# Patient Record
Sex: FEMALE | Race: WHITE | Employment: FULL TIME | URBAN - METROPOLITAN AREA
[De-identification: names, ages, dates, MRNs, and addresses within clinical notes are randomized per-mention and may not be internally consistent; named-entity substitution may affect disease eponyms.]

---

## 2017-03-24 PROBLEM — M54.9 BACK PAIN: Status: ACTIVE | Noted: 2017-03-24

## 2017-03-24 PROBLEM — M47.27 LUMBOSACRAL SPONDYLOSIS WITH RADICULOPATHY: Status: ACTIVE | Noted: 2017-03-24

## 2017-05-09 PROCEDURE — 86160 COMPLEMENT ANTIGEN: CPT | Performed by: INTERNAL MEDICINE

## 2017-05-09 PROCEDURE — 86038 ANTINUCLEAR ANTIBODIES: CPT | Performed by: INTERNAL MEDICINE

## 2017-05-09 PROCEDURE — 86800 THYROGLOBULIN ANTIBODY: CPT | Performed by: INTERNAL MEDICINE

## 2017-05-09 PROCEDURE — 84432 ASSAY OF THYROGLOBULIN: CPT | Performed by: INTERNAL MEDICINE

## 2017-05-09 PROCEDURE — 81003 URINALYSIS AUTO W/O SCOPE: CPT | Performed by: INTERNAL MEDICINE

## 2017-07-29 PROCEDURE — 84432 ASSAY OF THYROGLOBULIN: CPT | Performed by: INTERNAL MEDICINE

## 2017-07-29 PROCEDURE — 86800 THYROGLOBULIN ANTIBODY: CPT | Performed by: INTERNAL MEDICINE

## 2017-09-01 ENCOUNTER — HOSPITAL ENCOUNTER (OUTPATIENT)
Age: 52
Discharge: HOME | End: 2017-09-01
Attending: FAMILY MEDICINE
Payer: COMMERCIAL

## 2017-09-01 VITALS
OXYGEN SATURATION: 97 % | HEIGHT: 63 IN | SYSTOLIC BLOOD PRESSURE: 128 MMHG | WEIGHT: 156 LBS | TEMPERATURE: 99 F | HEART RATE: 72 BPM | DIASTOLIC BLOOD PRESSURE: 72 MMHG | BODY MASS INDEX: 27.64 KG/M2 | RESPIRATION RATE: 18 BRPM

## 2017-09-01 DIAGNOSIS — J20.9 BRONCHITIS WITH ASTHMA, SUBACUTE: ICD-10-CM

## 2017-09-01 DIAGNOSIS — J30.1 CHRONIC SEASONAL ALLERGIC RHINITIS DUE TO POLLEN: Primary | ICD-10-CM

## 2017-09-01 DIAGNOSIS — J45.909 BRONCHITIS WITH ASTHMA, SUBACUTE: ICD-10-CM

## 2017-09-01 PROCEDURE — 99203 OFFICE O/P NEW LOW 30 MIN: CPT

## 2017-09-01 PROCEDURE — 99213 OFFICE O/P EST LOW 20 MIN: CPT

## 2017-09-01 RX ORDER — PREDNISONE 20 MG/1
60 TABLET ORAL DAILY
Qty: 15 TABLET | Refills: 0 | Status: SHIPPED | OUTPATIENT
Start: 2017-09-01 | End: 2017-09-06

## 2017-09-01 RX ORDER — ALBUTEROL SULFATE 90 UG/1
2 AEROSOL, METERED RESPIRATORY (INHALATION) EVERY 6 HOURS
Qty: 1 INHALER | Refills: 5 | Status: SHIPPED | OUTPATIENT
Start: 2017-09-01 | End: 2019-07-13

## 2017-09-01 RX ORDER — AZITHROMYCIN 250 MG/1
TABLET, FILM COATED ORAL
Qty: 1 PACKAGE | Refills: 0 | Status: SHIPPED | OUTPATIENT
Start: 2017-09-01 | End: 2017-09-12 | Stop reason: ALTCHOICE

## 2017-09-01 NOTE — ED PROVIDER NOTES
Patient Seen in: [facility]    History   Patient presents with:  Cough/URI    Stated Complaint: cough x1 day    HPI    Patient past with a history of seasonal allergic rhinitis and asthma with 1 week of nonproductive cough, Tab,  500 mg once followed by 250 mg daily x 4 days   predniSONE 20 MG Oral Tab,  Take 3 tablets (60 mg total) by mouth daily.    Albuterol Sulfate HFA (PROAIR HFA) 108 (90 Base) MCG/ACT Inhalation Aero Soln,  Inhale 2 puffs into the lungs every 6 (six) chuck • Genito-Urinary Disorder Sister      hysterectomy, fibroid   • Musculo-skelatal Disorder Sister      spinal surgery   • Arthritis Daughter      rheumatoid arthritis   • Asthma Daughter      PCOS   • Asthma Daughter    • Allergies Daughter    • Glaucoma worse.        Disposition and Plan     Clinical Impression:  Chronic seasonal allergic rhinitis due to pollen  (primary encounter diagnosis)  Bronchitis with asthma, subacute    Disposition:  Discharge    Follow-up:  Leandra Beltran MD  [address] [place] [address]

## 2017-09-01 NOTE — ED INITIAL ASSESSMENT (HPI)
Pt c/o coughing and sinus congestion. Pt has environmental allergies. Pt has a hx of asthma and states that every allergy season she gets very sick very quickly. Pt is trying to get her cough under control before it \"gets bad\".

## 2017-09-02 NOTE — ED NOTES
Spoke with patient after receiving a [facility] fax with question. Pt. Asked if she can take Zyrtec with her steroids. Dr. Boris Reyes reports that she can take the medications together. Pt. Reports she is feeling better today.   No further questions or concern

## 2017-09-11 ENCOUNTER — HOSPITAL ENCOUNTER (OUTPATIENT)
Dept: PHYSICAL THERAPY | Facility: HOSPITAL | Age: 52
Setting detail: THERAPIES SERIES
End: 2017-09-11
Attending: PHYSICAL MEDICINE & REHABILITATION
Payer: COMMERCIAL

## 2017-09-13 ENCOUNTER — APPOINTMENT (OUTPATIENT)
Dept: PHYSICAL THERAPY | Facility: HOSPITAL | Age: 52
End: 2017-09-13
Payer: COMMERCIAL

## 2017-09-18 ENCOUNTER — HOSPITAL ENCOUNTER (OUTPATIENT)
Dept: PHYSICAL THERAPY | Facility: HOSPITAL | Age: 52
Setting detail: THERAPIES SERIES
Discharge: HOME | End: 2017-09-18
Attending: PHYSICAL MEDICINE & REHABILITATION
Payer: COMMERCIAL

## 2017-09-18 DIAGNOSIS — M79.10 MYALGIA: ICD-10-CM

## 2017-09-18 PROCEDURE — 97110 THERAPEUTIC EXERCISES: CPT

## 2017-09-18 PROCEDURE — 97161 PT EVAL LOW COMPLEX 20 MIN: CPT

## 2017-09-20 ENCOUNTER — APPOINTMENT (OUTPATIENT)
Dept: PHYSICAL THERAPY | Facility: HOSPITAL | Age: 52
End: 2017-09-20
Payer: COMMERCIAL

## 2017-09-27 ENCOUNTER — APPOINTMENT (OUTPATIENT)
Dept: PHYSICAL THERAPY | Facility: HOSPITAL | Age: 52
End: 2017-09-27
Payer: COMMERCIAL

## 2017-10-02 ENCOUNTER — HOSPITAL ENCOUNTER (OUTPATIENT)
Dept: PHYSICAL THERAPY | Facility: HOSPITAL | Age: 52
Setting detail: THERAPIES SERIES
Discharge: HOME | End: 2017-10-02
Attending: PHYSICAL MEDICINE & REHABILITATION
Payer: COMMERCIAL

## 2017-10-02 PROCEDURE — 97140 MANUAL THERAPY 1/> REGIONS: CPT

## 2017-10-02 PROCEDURE — 97110 THERAPEUTIC EXERCISES: CPT

## 2017-10-04 NOTE — PROGRESS NOTES
Dx: Myalgia; back pain        Authorized # of Visits:  18 vists         Next MD visit: none scheduled  Fall Risk: standard         Precautions: n/a             Subjective: Pt. Arrived 15 minutes late. Wearing CAM boot on R foot.  States she threw out her ba region. 7. Patient to be independent with HEP, joint protection principles, improved posture and body mechanics. Plan: Assess response to last session. Add seated on SB next session. Date: 10/4/2017  Tx#: 2/18 Date: Tx#: 3/ Date:    Tx#: 4/ Da

## 2017-10-16 ENCOUNTER — HOSPITAL ENCOUNTER (OUTPATIENT)
Dept: PHYSICAL THERAPY | Facility: HOSPITAL | Age: 52
Setting detail: THERAPIES SERIES
Discharge: HOME | End: 2017-10-16
Attending: PHYSICAL MEDICINE & REHABILITATION
Payer: COMMERCIAL

## 2017-10-16 PROCEDURE — 97110 THERAPEUTIC EXERCISES: CPT

## 2017-10-16 PROCEDURE — 97140 MANUAL THERAPY 1/> REGIONS: CPT

## 2017-10-16 NOTE — PROGRESS NOTES
6jDx: Myalgia; back pain        Authorized # of Visits:  18 vists         Next MD visit: none scheduled  Fall Risk: standard         Precautions: n/a             Subjective: Pt. Arrived 15 minutes late. No longer wearing CAM boot.  States she got an injecti feet x 2 laps        Transfer training Airex  *step ups x 10  *fwd lunge x 10  *lat lunge x 10  *squats x 10        DKTC x 10 Seated on SB  *pelvic circles cw/ccw x 10  *march x 10  *knee ext x 10  *opp arm/leg x 10          SKTC x 10 Mass flex with SB  *a

## 2017-10-23 ENCOUNTER — HOSPITAL ENCOUNTER (OUTPATIENT)
Dept: PHYSICAL THERAPY | Facility: HOSPITAL | Age: 52
Setting detail: THERAPIES SERIES
Discharge: HOME | End: 2017-10-23
Attending: PHYSICAL MEDICINE & REHABILITATION
Payer: COMMERCIAL

## 2017-10-23 PROCEDURE — 97140 MANUAL THERAPY 1/> REGIONS: CPT

## 2017-10-23 PROCEDURE — 97110 THERAPEUTIC EXERCISES: CPT

## 2017-10-23 NOTE — PROGRESS NOTES
6jDx: Myalgia; back pain        Authorized # of Visits:  18 vists         Next MD visit: none scheduled  Fall Risk: standard         Precautions: n/a             Subjective: Pt. Arrived 15 minutes late. Pt. States currently no pain.  Has had a good day toda Mass flex with SB  *all four squeeze x 10  *diagonal squeeze x 10 Seated on SB  *pelvic circles cw/ccw x 10  *march x 10  *knee ext x 10  *opp arm/leg x 10       LTR x 10 DKTC with SB x 10    LTR with SB x 10 Mass flex with SB with alt arm lift x 10  *with

## 2017-10-30 ENCOUNTER — HOSPITAL ENCOUNTER (OUTPATIENT)
Dept: PHYSICAL THERAPY | Facility: HOSPITAL | Age: 52
Setting detail: THERAPIES SERIES
Discharge: HOME | End: 2017-10-30
Attending: PHYSICAL MEDICINE & REHABILITATION
Payer: COMMERCIAL

## 2017-10-30 PROCEDURE — 97140 MANUAL THERAPY 1/> REGIONS: CPT

## 2017-10-30 PROCEDURE — 97110 THERAPEUTIC EXERCISES: CPT

## 2017-11-02 NOTE — PROGRESS NOTES
Dx: Myalgia; back pain        Authorized # of Visits:  18 vists         Next MD visit: none scheduled  Fall Risk: standard         Precautions: n/a             Subjective: Pt. Presents emotional with tears in eyes, states they announced changes at her work stretch on 8 inch step x 10 Quad stretch on 8 inch step x 10      DKTC x 10 Seated on SB  *pelvic circles cw/ccw x 10  *march x 10  *knee ext x 10  *opp arm/leg x 10   Bosu ball  *step ups x 10  *fwd lunge x 10  *lat lunge x 10  *squats x 10 Bosu ball  *st

## 2017-11-06 ENCOUNTER — HOSPITAL ENCOUNTER (OUTPATIENT)
Dept: PHYSICAL THERAPY | Facility: HOSPITAL | Age: 52
Setting detail: THERAPIES SERIES
End: 2017-11-06
Attending: PHYSICAL MEDICINE & REHABILITATION
Payer: COMMERCIAL

## 2017-11-13 ENCOUNTER — APPOINTMENT (OUTPATIENT)
Dept: PHYSICAL THERAPY | Facility: HOSPITAL | Age: 52
End: 2017-11-13
Attending: PHYSICAL MEDICINE & REHABILITATION
Payer: COMMERCIAL

## 2017-11-14 ENCOUNTER — HOSPITAL ENCOUNTER (OUTPATIENT)
Dept: PHYSICAL THERAPY | Facility: HOSPITAL | Age: 52
Setting detail: THERAPIES SERIES
Discharge: HOME | End: 2017-11-14
Attending: PHYSICAL MEDICINE & REHABILITATION
Payer: COMMERCIAL

## 2017-11-14 PROCEDURE — 97140 MANUAL THERAPY 1/> REGIONS: CPT

## 2017-11-14 PROCEDURE — 97110 THERAPEUTIC EXERCISES: CPT

## 2017-11-15 NOTE — PROGRESS NOTES
Dx: Myalgia; back pain        Authorized # of Visits:  18 vists         Next MD visit: none scheduled  Fall Risk: standard         Precautions: n/a             Subjective: Patient reports having R sided low back pain that wrapped around to front of hip aft 10  *fwd lunge x 10  *lat lunge x 10  *squats x 10 Quad stretch on 8 inch step x 10 Quad stretch on 8 inch step x 10 Quad stretch on 8 inch step x 10     DKTC x 10 Seated on SB  *pelvic circles cw/ccw x 10  *march x 10  *knee ext x 10  *opp arm/leg x 10 lumbar PAs and unilat x 10  L sidelying rotationals mobs Gr I and II Gentle GR I and II lumbar PAs and unilat x 10  L sidelying rotationals mobs Gr I and II Gentle GR I and II lumbar PAs and unilat x 10  L sidelying rotationals mobs Gr I and II Gentle GR I

## 2017-11-20 PROCEDURE — 86800 THYROGLOBULIN ANTIBODY: CPT | Performed by: INTERNAL MEDICINE

## 2017-11-20 PROCEDURE — 84432 ASSAY OF THYROGLOBULIN: CPT | Performed by: INTERNAL MEDICINE

## 2017-12-11 ENCOUNTER — HOSPITAL ENCOUNTER (OUTPATIENT)
Dept: PHYSICAL THERAPY | Facility: HOSPITAL | Age: 52
Setting detail: THERAPIES SERIES
Discharge: HOME | End: 2017-12-11
Attending: PHYSICAL MEDICINE & REHABILITATION
Payer: COMMERCIAL

## 2017-12-11 PROCEDURE — 97110 THERAPEUTIC EXERCISES: CPT

## 2017-12-11 PROCEDURE — 97140 MANUAL THERAPY 1/> REGIONS: CPT

## 2017-12-26 NOTE — PROGRESS NOTES
Dx: Myalgia; back pain        Authorized # of Visits:  18 vists         Next MD visit: none scheduled  Fall Risk: standard         Precautions: n/a             Subjective: Patient reports having R sided low back pain that wrapped around to front of hip aft 10  *squats x 10 Quad stretch on 8 inch step x 10 Quad stretch on 8 inch step x 10 Quad stretch on 8 inch step x 10 Quad stretch on 8 inch step x 10    DKTC x 10 Seated on SB  *pelvic circles cw/ccw x 10  *march x 10  *knee ext x 10  *opp arm/leg x 10   [place] Bridge with SB and alt leg lift x 10    Bridging x 10 Bridge with SB x 10 HS stretch with flossing HS stretch with flossing HS stretch with flossing HS stretch with flossing    PPT c march x 10         STM lumbar paraspinals, QL STM lumbar paraspinals, QL

## 2017-12-27 ENCOUNTER — HOSPITAL ENCOUNTER (OUTPATIENT)
Dept: PHYSICAL THERAPY | Facility: HOSPITAL | Age: 52
Setting detail: THERAPIES SERIES
Discharge: HOME | End: 2017-12-27
Attending: PHYSICAL MEDICINE & REHABILITATION
Payer: COMMERCIAL

## 2017-12-27 PROCEDURE — 97110 THERAPEUTIC EXERCISES: CPT

## 2017-12-27 PROCEDURE — 97140 MANUAL THERAPY 1/> REGIONS: CPT

## 2017-12-28 NOTE — PROGRESS NOTES
Dear Dr. Jocelyne Singh MD,    This letter is to inform you of Anaid Fuentes in Physical Therapy at [facility].     DX: Myalgia; back pain      TREATMENT #   8    Of    8   DATE OF SE region. MET    7. Patient to be independent with HEP, joint protection principles, improved posture and body mechanics. MET    RECOMMENDATIONS AND PLAN OF TREATMENT  All goals met. Discharge patient. Patient to continue independently with current HEP.     R

## 2018-01-29 PROBLEM — J06.9 VIRAL URI: Status: ACTIVE | Noted: 2018-01-29

## 2018-05-14 PROCEDURE — 86800 THYROGLOBULIN ANTIBODY: CPT | Performed by: INTERNAL MEDICINE

## 2018-05-14 PROCEDURE — 36415 COLL VENOUS BLD VENIPUNCTURE: CPT | Performed by: INTERNAL MEDICINE

## 2018-05-14 PROCEDURE — 84432 ASSAY OF THYROGLOBULIN: CPT | Performed by: INTERNAL MEDICINE

## 2018-06-21 PROBLEM — M95.8 OSTEOCHONDRAL DEFECT OF TALUS: Status: ACTIVE | Noted: 2018-06-21

## 2018-07-18 PROCEDURE — 36415 COLL VENOUS BLD VENIPUNCTURE: CPT | Performed by: FAMILY MEDICINE

## 2018-07-18 PROCEDURE — 88175 CYTOPATH C/V AUTO FLUID REDO: CPT | Performed by: FAMILY MEDICINE

## 2018-07-18 PROCEDURE — 83001 ASSAY OF GONADOTROPIN (FSH): CPT | Performed by: FAMILY MEDICINE

## 2018-07-18 PROCEDURE — 87624 HPV HI-RISK TYP POOLED RSLT: CPT | Performed by: FAMILY MEDICINE

## 2018-07-24 PROBLEM — R00.2 PALPITATIONS: Status: ACTIVE | Noted: 2018-07-24

## 2018-12-27 PROCEDURE — 87086 URINE CULTURE/COLONY COUNT: CPT | Performed by: NURSE PRACTITIONER

## 2019-06-07 PROCEDURE — 86800 THYROGLOBULIN ANTIBODY: CPT | Performed by: INTERNAL MEDICINE

## 2019-06-07 PROCEDURE — 84432 ASSAY OF THYROGLOBULIN: CPT | Performed by: INTERNAL MEDICINE

## 2019-06-16 PROCEDURE — 87081 CULTURE SCREEN ONLY: CPT | Performed by: PHYSICIAN ASSISTANT

## 2019-09-03 PROBLEM — Z85.850 HISTORY OF THYROID CANCER: Status: ACTIVE | Noted: 2019-09-03

## 2019-09-03 PROBLEM — Z23 FLU VACCINE NEED: Status: ACTIVE | Noted: 2019-09-03

## 2020-03-01 ENCOUNTER — APPOINTMENT (OUTPATIENT)
Dept: CT IMAGING | Facility: HOSPITAL | Age: 55
End: 2020-03-01
Attending: EMERGENCY MEDICINE
Payer: COMMERCIAL

## 2020-03-01 ENCOUNTER — APPOINTMENT (OUTPATIENT)
Dept: MRI IMAGING | Facility: HOSPITAL | Age: 55
End: 2020-03-01
Attending: INTERNAL MEDICINE
Payer: COMMERCIAL

## 2020-03-01 ENCOUNTER — APPOINTMENT (OUTPATIENT)
Dept: GENERAL RADIOLOGY | Facility: HOSPITAL | Age: 55
End: 2020-03-01
Attending: EMERGENCY MEDICINE
Payer: COMMERCIAL

## 2020-03-01 ENCOUNTER — HOSPITAL ENCOUNTER (OUTPATIENT)
Facility: HOSPITAL | Age: 55
Setting detail: OBSERVATION
Discharge: HOME | End: 2020-03-02
Attending: EMERGENCY MEDICINE | Admitting: INTERNAL MEDICINE
Payer: COMMERCIAL

## 2020-03-01 DIAGNOSIS — R55 SYNCOPE, UNSPECIFIED SYNCOPE TYPE: Primary | ICD-10-CM

## 2020-03-01 PROBLEM — D72.829 LEUKOCYTOSIS: Status: ACTIVE | Noted: 2020-03-01

## 2020-03-01 LAB
ALBUMIN SERPL-MCNC: 3.6 G/DL (ref 3.4–5)
ALBUMIN/GLOB SERPL: 0.9 {RATIO} (ref 1–2)
ALP LIVER SERPL-CCNC: 85 U/L (ref 41–108)
ALT SERPL-CCNC: 42 U/L (ref 13–56)
ANION GAP SERPL CALC-SCNC: 6 MMOL/L (ref 0–18)
AST SERPL-CCNC: 44 U/L (ref 15–37)
ATRIAL RATE: 69 BPM
BASOPHILS # BLD AUTO: 0.02 X10(3) UL (ref 0–0.2)
BASOPHILS NFR BLD AUTO: 0.1 %
BILIRUB SERPL-MCNC: 0.5 MG/DL (ref 0.1–2)
BILIRUB UR QL STRIP.AUTO: NEGATIVE
BUN BLD-MCNC: 14 MG/DL (ref 7–18)
BUN/CREAT SERPL: 16.7 (ref 10–20)
CALCIUM BLD-MCNC: 8.5 MG/DL (ref 8.5–10.1)
CHLORIDE SERPL-SCNC: 110 MMOL/L (ref 98–112)
CLARITY UR REFRACT.AUTO: CLEAR
CO2 SERPL-SCNC: 25 MMOL/L (ref 21–32)
COLOR UR AUTO: YELLOW
CREAT BLD-MCNC: 0.84 MG/DL (ref 0.55–1.02)
D-DIMER: 0.88 UG/ML FEU (ref ?–0.54)
DEPRECATED RDW RBC AUTO: 41.9 FL (ref 35.1–46.3)
EOSINOPHIL # BLD AUTO: 0.02 X10(3) UL (ref 0–0.7)
EOSINOPHIL NFR BLD AUTO: 0.1 %
ERYTHROCYTE [DISTWIDTH] IN BLOOD BY AUTOMATED COUNT: 11.9 % (ref 11–15)
FLUAV + FLUBV RNA SPEC NAA+PROBE: NEGATIVE
GLOBULIN PLAS-MCNC: 3.8 G/DL (ref 2.8–4.4)
GLUCOSE BLD-MCNC: 98 MG/DL (ref 70–99)
GLUCOSE UR STRIP.AUTO-MCNC: NEGATIVE MG/DL
HCT VFR BLD AUTO: 42.6 % (ref 35–48)
HGB BLD-MCNC: 14.1 G/DL (ref 12–16)
IMM GRANULOCYTES # BLD AUTO: 0.06 X10(3) UL (ref 0–1)
IMM GRANULOCYTES NFR BLD: 0.4 %
KETONES UR STRIP.AUTO-MCNC: NEGATIVE MG/DL
LYMPHOCYTES # BLD AUTO: 0.36 X10(3) UL (ref 1–4)
LYMPHOCYTES NFR BLD AUTO: 2.5 %
M PROTEIN MFR SERPL ELPH: 7.4 G/DL (ref 6.4–8.2)
MCH RBC QN AUTO: 31.3 PG (ref 26–34)
MCHC RBC AUTO-ENTMCNC: 33.1 G/DL (ref 31–37)
MCV RBC AUTO: 94.7 FL (ref 80–100)
MONOCYTES # BLD AUTO: 0.44 X10(3) UL (ref 0.1–1)
MONOCYTES NFR BLD AUTO: 3 %
NEUTROPHILS # BLD AUTO: 13.55 X10 (3) UL (ref 1.5–7.7)
NEUTROPHILS # BLD AUTO: 13.55 X10(3) UL (ref 1.5–7.7)
NEUTROPHILS NFR BLD AUTO: 93.9 %
NITRITE UR QL STRIP.AUTO: NEGATIVE
OSMOLALITY SERPL CALC.SUM OF ELEC: 292 MOSM/KG (ref 275–295)
P AXIS: 50 DEGREES
P-R INTERVAL: 210 MS
PH UR STRIP.AUTO: 6 [PH] (ref 4.5–8)
PLATELET # BLD AUTO: 263 10(3)UL (ref 150–450)
POTASSIUM SERPL-SCNC: 4.2 MMOL/L (ref 3.5–5.1)
PROT UR STRIP.AUTO-MCNC: NEGATIVE MG/DL
Q-T INTERVAL: 414 MS
QRS DURATION: 80 MS
QTC CALCULATION (BEZET): 443 MS
R AXIS: 35 DEGREES
RBC # BLD AUTO: 4.5 X10(6)UL (ref 3.8–5.3)
RBC UR QL AUTO: NEGATIVE
SODIUM SERPL-SCNC: 141 MMOL/L (ref 136–145)
SP GR UR STRIP.AUTO: 1.03 (ref 1–1.03)
T AXIS: 54 DEGREES
T3FREE SERPL-MCNC: 2.43 PG/ML (ref 2.4–4.2)
T4 FREE SERPL-MCNC: 1.1 NG/DL (ref 0.8–1.7)
TROPONIN I SERPL-MCNC: <0.045 NG/ML (ref ?–0.04)
TSI SER-ACNC: 0.18 MIU/ML (ref 0.36–3.74)
UROBILINOGEN UR STRIP.AUTO-MCNC: <2 MG/DL
VENTRICULAR RATE: 69 BPM
WBC # BLD AUTO: 14.5 X10(3) UL (ref 4–11)

## 2020-03-01 PROCEDURE — 85379 FIBRIN DEGRADATION QUANT: CPT | Performed by: EMERGENCY MEDICINE

## 2020-03-01 PROCEDURE — 80053 COMPREHEN METABOLIC PANEL: CPT | Performed by: EMERGENCY MEDICINE

## 2020-03-01 PROCEDURE — 99285 EMERGENCY DEPT VISIT HI MDM: CPT

## 2020-03-01 PROCEDURE — A9575 INJ GADOTERATE MEGLUMI 0.1ML: HCPCS | Performed by: INTERNAL MEDICINE

## 2020-03-01 PROCEDURE — 84443 ASSAY THYROID STIM HORMONE: CPT | Performed by: INTERNAL MEDICINE

## 2020-03-01 PROCEDURE — 87086 URINE CULTURE/COLONY COUNT: CPT | Performed by: INTERNAL MEDICINE

## 2020-03-01 PROCEDURE — 87502 INFLUENZA DNA AMP PROBE: CPT | Performed by: EMERGENCY MEDICINE

## 2020-03-01 PROCEDURE — 71045 X-RAY EXAM CHEST 1 VIEW: CPT | Performed by: EMERGENCY MEDICINE

## 2020-03-01 PROCEDURE — 84481 FREE ASSAY (FT-3): CPT | Performed by: INTERNAL MEDICINE

## 2020-03-01 PROCEDURE — 85025 COMPLETE CBC W/AUTO DIFF WBC: CPT | Performed by: EMERGENCY MEDICINE

## 2020-03-01 PROCEDURE — 84484 ASSAY OF TROPONIN QUANT: CPT | Performed by: EMERGENCY MEDICINE

## 2020-03-01 PROCEDURE — 93005 ELECTROCARDIOGRAM TRACING: CPT

## 2020-03-01 PROCEDURE — 84439 ASSAY OF FREE THYROXINE: CPT | Performed by: INTERNAL MEDICINE

## 2020-03-01 PROCEDURE — 70553 MRI BRAIN STEM W/O & W/DYE: CPT | Performed by: INTERNAL MEDICINE

## 2020-03-01 PROCEDURE — 71275 CT ANGIOGRAPHY CHEST: CPT | Performed by: EMERGENCY MEDICINE

## 2020-03-01 PROCEDURE — 96360 HYDRATION IV INFUSION INIT: CPT

## 2020-03-01 PROCEDURE — 93010 ELECTROCARDIOGRAM REPORT: CPT

## 2020-03-01 PROCEDURE — 87999 UNLISTED MICROBIOLOGY PX: CPT

## 2020-03-01 PROCEDURE — 81001 URINALYSIS AUTO W/SCOPE: CPT | Performed by: INTERNAL MEDICINE

## 2020-03-01 PROCEDURE — 87798 DETECT AGENT NOS DNA AMP: CPT | Performed by: EMERGENCY MEDICINE

## 2020-03-01 RX ORDER — ASPIRIN 81 MG/1
324 TABLET, CHEWABLE ORAL ONCE
Status: COMPLETED | OUTPATIENT
Start: 2020-03-01 | End: 2020-03-01

## 2020-03-01 RX ORDER — AZELASTINE 1 MG/ML
2 SPRAY, METERED NASAL 2 TIMES DAILY
Status: DISCONTINUED | OUTPATIENT
Start: 2020-03-01 | End: 2020-03-02

## 2020-03-01 RX ORDER — ALBUTEROL SULFATE 2.5 MG/3ML
2.5 SOLUTION RESPIRATORY (INHALATION) EVERY 4 HOURS PRN
Status: DISCONTINUED | OUTPATIENT
Start: 2020-03-01 | End: 2020-03-02

## 2020-03-01 RX ORDER — MONTELUKAST SODIUM 10 MG/1
10 TABLET ORAL NIGHTLY
Status: DISCONTINUED | OUTPATIENT
Start: 2020-03-01 | End: 2020-03-02

## 2020-03-01 RX ORDER — IPRATROPIUM BROMIDE 42 UG/1
1 SPRAY, METERED NASAL 3 TIMES DAILY PRN
Status: DISCONTINUED | OUTPATIENT
Start: 2020-03-01 | End: 2020-03-02

## 2020-03-01 RX ORDER — ACETAMINOPHEN 325 MG/1
650 TABLET ORAL EVERY 6 HOURS PRN
Status: DISCONTINUED | OUTPATIENT
Start: 2020-03-01 | End: 2020-03-02

## 2020-03-01 RX ORDER — LEVOTHYROXINE SODIUM 112 UG/1
112 TABLET ORAL
Status: DISCONTINUED | OUTPATIENT
Start: 2020-03-02 | End: 2020-03-02

## 2020-03-01 RX ORDER — VERAPAMIL HYDROCHLORIDE 240 MG/1
240 TABLET, FILM COATED, EXTENDED RELEASE ORAL
Status: DISCONTINUED | OUTPATIENT
Start: 2020-03-01 | End: 2020-03-02

## 2020-03-01 RX ORDER — PANTOPRAZOLE SODIUM 20 MG/1
20 TABLET, DELAYED RELEASE ORAL DAILY
Status: DISCONTINUED | OUTPATIENT
Start: 2020-03-02 | End: 2020-03-02

## 2020-03-01 RX ORDER — METOCLOPRAMIDE HYDROCHLORIDE 5 MG/ML
10 INJECTION INTRAMUSCULAR; INTRAVENOUS EVERY 8 HOURS PRN
Status: DISCONTINUED | OUTPATIENT
Start: 2020-03-01 | End: 2020-03-02

## 2020-03-01 RX ORDER — PREGABALIN 50 MG/1
50 CAPSULE ORAL 2 TIMES DAILY
Status: DISCONTINUED | OUTPATIENT
Start: 2020-03-01 | End: 2020-03-02

## 2020-03-01 RX ORDER — ONDANSETRON 2 MG/ML
4 INJECTION INTRAMUSCULAR; INTRAVENOUS EVERY 6 HOURS PRN
Status: DISCONTINUED | OUTPATIENT
Start: 2020-03-01 | End: 2020-03-02

## 2020-03-01 RX ORDER — VALACYCLOVIR HYDROCHLORIDE 500 MG/1
500 TABLET, FILM COATED ORAL DAILY
Status: DISCONTINUED | OUTPATIENT
Start: 2020-03-02 | End: 2020-03-02

## 2020-03-01 RX ORDER — ECHINACEA PURPUREA EXTRACT 125 MG
1 TABLET ORAL 2 TIMES DAILY
Status: DISCONTINUED | OUTPATIENT
Start: 2020-03-01 | End: 2020-03-02

## 2020-03-01 RX ORDER — SODIUM CHLORIDE 9 MG/ML
INJECTION, SOLUTION INTRAVENOUS CONTINUOUS
Status: DISCONTINUED | OUTPATIENT
Start: 2020-03-01 | End: 2020-03-02

## 2020-03-01 RX ORDER — HEPARIN SODIUM 5000 [USP'U]/ML
5000 INJECTION, SOLUTION INTRAVENOUS; SUBCUTANEOUS EVERY 8 HOURS SCHEDULED
Status: CANCELLED | OUTPATIENT
Start: 2020-03-01

## 2020-03-01 NOTE — H&P
[facility] Hospitalist H&P       CC: Patient presents with:  Syncope       PCP: Didier Wilder MD    History of Present Illness:  Ms. Kecia Peña is a 48 yo female with PMH of asthma, thyroid cancer (2014 s/p thyroidectomy), GERD, ocular migraines who presented to t THYROIDECTOMY,MALIG,LTD NECK SURG     • UPPER GI ENDOSCOPY - REFERRAL  3/4/13 - [facility]    normal EGD.          ALL:    Dust Mites              HIVES, ITCHING  Mold                    Coughing  Ragweed                 Coughing     Home Medications:  No outpa kg)  01/20/20 : 165 lb (74.8 kg)  11/04/19 : 167 lb 9.6 oz (76 kg)      Wt Readings from Last 6 Encounters:  03/01/20 : 145 lb (65.8 kg)  01/20/20 : 165 lb (74.8 kg)  11/04/19 : 167 lb 9.6 oz (76 kg)  09/03/19 : 169 lb 6.4 oz (76.8 kg)  08/28/19 : 165 lb ( adenopathy or mass. MILI:  No mass or adenopathy. CARDIAC:  No enlargement, pericardial thickening, or significant calcification. PLEURA:  No mass or effusion. CHEST WALL:  No mass or axillary adenopathy.   LIMITED ABDOMEN:  There is a hyper enhancing le Thyroid Cancer s/p thyroidectomy in 2014  - continue home levothyroxine    # Asthma  - continue home meds    # Liver Lesion  - noted on CT -- will need outpatient f/u with PCP for MRI evaluation  - discussed with patient    # Elevated d-dimer  - CTA negati

## 2020-03-01 NOTE — ED PROVIDER NOTES
Patient Seen in: [facility] Emergency Department      History   Patient presents with:  Syncope    Stated Complaint: syncope x2     HPI    43-year-old with history of migraines, hypothyroidism presents for evaluation after syncopal event.   She woke reflux    • Glaucoma suspect    • Hashimoto's thyroiditis    • Latent tuberculosis 11/2016   • Menorrhagia    • Multinodular goiter (nontoxic)    • Ocular migraine               Past Surgical History:   Procedure Laterality Date   • BIOPSY OF [facility] Occasional cough is noted. Abdomen: Soft, nontender, nondistended. Back: No CVA tenderness. Extremities: No edema. No unilateral calf swelling or calf tenderness to palpation.   Skin: warm and dry, no diaphoresis    ED Course     Labs Reviewed   COMP M the hospital for further work-up. Admission disposition: 3/1/2020  2:16 PM         I spoke with Dr. Lyla Murphy from the hospitalist service.     At her request I also spoke with Dr. Liliana Wong from neurology    Results and plan of care discussed with the patien

## 2020-03-01 NOTE — ED INITIAL ASSESSMENT (HPI)
Pt presents to ed after having two syncope episodes, pt reports feeling nauseous and hot prior to event. Pt has no chest pain or SOB and states this has never happened before.

## 2020-03-02 ENCOUNTER — APPOINTMENT (OUTPATIENT)
Dept: CV DIAGNOSTICS | Facility: HOSPITAL | Age: 55
End: 2020-03-02
Attending: INTERNAL MEDICINE
Payer: COMMERCIAL

## 2020-03-02 VITALS
HEART RATE: 60 BPM | OXYGEN SATURATION: 97 % | BODY MASS INDEX: 29.64 KG/M2 | DIASTOLIC BLOOD PRESSURE: 68 MMHG | WEIGHT: 167.31 LBS | SYSTOLIC BLOOD PRESSURE: 117 MMHG | HEIGHT: 63 IN | TEMPERATURE: 100 F | RESPIRATION RATE: 16 BRPM

## 2020-03-02 LAB
ANION GAP SERPL CALC-SCNC: 4 MMOL/L (ref 0–18)
BASOPHILS # BLD AUTO: 0.01 X10(3) UL (ref 0–0.2)
BASOPHILS NFR BLD AUTO: 0.1 %
BUN BLD-MCNC: 11 MG/DL (ref 7–18)
BUN/CREAT SERPL: 16.9 (ref 10–20)
CALCIUM BLD-MCNC: 8.4 MG/DL (ref 8.5–10.1)
CHLORIDE SERPL-SCNC: 111 MMOL/L (ref 98–112)
CO2 SERPL-SCNC: 26 MMOL/L (ref 21–32)
CREAT BLD-MCNC: 0.65 MG/DL (ref 0.55–1.02)
DEPRECATED RDW RBC AUTO: 42.2 FL (ref 35.1–46.3)
EOSINOPHIL # BLD AUTO: 0.03 X10(3) UL (ref 0–0.7)
EOSINOPHIL NFR BLD AUTO: 0.4 %
ERYTHROCYTE [DISTWIDTH] IN BLOOD BY AUTOMATED COUNT: 12.1 % (ref 11–15)
GLUCOSE BLD-MCNC: 93 MG/DL (ref 70–99)
HCT VFR BLD AUTO: 38.7 % (ref 35–48)
HGB BLD-MCNC: 12.7 G/DL (ref 12–16)
IMM GRANULOCYTES # BLD AUTO: 0.02 X10(3) UL (ref 0–1)
IMM GRANULOCYTES NFR BLD: 0.3 %
LYMPHOCYTES # BLD AUTO: 0.88 X10(3) UL (ref 1–4)
LYMPHOCYTES NFR BLD AUTO: 12.7 %
MCH RBC QN AUTO: 31 PG (ref 26–34)
MCHC RBC AUTO-ENTMCNC: 32.8 G/DL (ref 31–37)
MCV RBC AUTO: 94.4 FL (ref 80–100)
MONOCYTES # BLD AUTO: 0.52 X10(3) UL (ref 0.1–1)
MONOCYTES NFR BLD AUTO: 7.5 %
NEUTROPHILS # BLD AUTO: 5.47 X10 (3) UL (ref 1.5–7.7)
NEUTROPHILS # BLD AUTO: 5.47 X10(3) UL (ref 1.5–7.7)
NEUTROPHILS NFR BLD AUTO: 79 %
OSMOLALITY SERPL CALC.SUM OF ELEC: 291 MOSM/KG (ref 275–295)
PLATELET # BLD AUTO: 235 10(3)UL (ref 150–450)
POTASSIUM SERPL-SCNC: 3.5 MMOL/L (ref 3.5–5.1)
POTASSIUM SERPL-SCNC: 3.6 MMOL/L (ref 3.5–5.1)
RBC # BLD AUTO: 4.1 X10(6)UL (ref 3.8–5.3)
SODIUM SERPL-SCNC: 141 MMOL/L (ref 136–145)
WBC # BLD AUTO: 6.9 X10(3) UL (ref 4–11)

## 2020-03-02 PROCEDURE — 84132 ASSAY OF SERUM POTASSIUM: CPT | Performed by: INTERNAL MEDICINE

## 2020-03-02 PROCEDURE — 93306 TTE W/DOPPLER COMPLETE: CPT | Performed by: INTERNAL MEDICINE

## 2020-03-02 PROCEDURE — 85025 COMPLETE CBC W/AUTO DIFF WBC: CPT | Performed by: INTERNAL MEDICINE

## 2020-03-02 PROCEDURE — 80048 BASIC METABOLIC PNL TOTAL CA: CPT | Performed by: INTERNAL MEDICINE

## 2020-03-02 RX ORDER — CEPHALEXIN 500 MG/1
500 CAPSULE ORAL 4 TIMES DAILY
Qty: 28 CAPSULE | Refills: 0 | Status: SHIPPED | OUTPATIENT
Start: 2020-03-02 | End: 2020-03-04 | Stop reason: ALTCHOICE

## 2020-03-02 RX ORDER — POTASSIUM CHLORIDE 20 MEQ/1
40 TABLET, EXTENDED RELEASE ORAL EVERY 4 HOURS
Status: DISCONTINUED | OUTPATIENT
Start: 2020-03-02 | End: 2020-03-02

## 2020-03-02 NOTE — PLAN OF CARE
Patient admitted with syncope ,denies any dizzy/chest pain,patient a&ox3 on room air ,ivf 0.9ns @ 100ml/hr infusing ,reported migraine headache and requested tylenol ,administered and effective,patient resting, v/s obtained ,echo and some labs in am,poc up Return mobility to safest level of function  Description  INTERVENTIONS:  - Assess patient stability and activity tolerance for standing, transferring and ambulating w/ or w/o assistive devices  - Assist with transfers and ambulation using safe patient cervantes

## 2020-03-02 NOTE — PLAN OF CARE
Patient is alert and oriented x4. NSR on tele. Oxygenation is 100% on RA. L sounds clear. VSS. Patient denies chest pain, shortness of breath, palpitations. Denies pain. Patient is resting comfortably in bed at this time.     POC: 0.9 at 100 ml/hr, ECHO, ne antiarrhythmic and heart rate control medications as ordered  - Initiate emergency measures for life threatening arrhythmias  - Monitor electrolytes and administer replacement therapy as ordered  Outcome: Progressing     Problem: MUSCULOSKELETAL - ADULT  G

## 2020-03-02 NOTE — CONSULTS
[facility]     Patient name:  Rosendo Ni    :    1965  MRN:   [id number]    Location:  [address]  Attending:  Yanick Baez MD    Date of Admission:   3/1/2020    HISTORY OF PRESENT ILLNESS   M Bilateral 1983    wisdom teeth x 3   • NM THYROID I-131 THERAPY FOR HYPERTHYROIDISM INT  (ZCA=77815)  2/5/2015    51 mc   • OSTEOCHONDRAL TALUS AUTOGRFT  11/30/2011    right ankle   • OTHER SURGICAL HISTORY Right     ankle surgery    • THYROIDECTOMY,MALIG, Nasal BID   • Levothyroxine Sodium  112 mcg Oral Before breakfast   • valACYclovir HCl  500 mg Oral Daily   • Verapamil HCl ER  240 mg Oral Daily     Continuous Infusions:  • sodium chloride 100 mL/hr at 03/02/20 0443     PRN Meds:  acetaminophen, ondanset 03/02/20  0528   WBC 14.5* 6.9   HGB 14.1 12.7   MCV 94.7 94.4   .0 235.0     Recent Labs   Lab 03/01/20  1129 03/02/20  0528    141   K 4.2 3.5    111   CO2 25.0 26.0   BUN 14 11   CREATSERUM 0.84 0.65   CA 8.5 8.4*   GLU 98 93     Rece

## 2020-03-03 ENCOUNTER — TELEPHONE (OUTPATIENT)
Dept: CARDIOLOGY UNIT | Facility: HOSPITAL | Age: 55
End: 2020-03-03

## 2020-03-03 NOTE — DISCHARGE SUMMARY
General Medicine Discharge Summary     Patient ID:  Blair Waldrop  47year old  8/17/1965    Admit date: 3/1/2020    Discharge date and time: 3/2/2020  4:45 PM     Attending Physician: Tina Gay MD    Primary Care Physician: Moni Ramírez MD 25 MG Oral Cap  Take 2 capsules (50 mg total) by mouth 2 (two) times daily. , Normal, Disp-120 capsule, R-2    Azelastine HCl 0.1 % Nasal Solution  2 sprays by Nasal route 2 (two) times daily. , Normal, Disp-1 Bottle, R-5    MONTELUKAST SODIUM 10 MG Oral Tab Magnesium (PRILOSEC OTC) 20 MG Oral Tab EC  Take 20 mg by mouth daily.   , Historical, Disp-14 tablet, R-3    Peak Flow Meter (POCKET PEAK FLOW METER) Does not apply Device  Use as needed, Normal, Disp-1 Device, R-0            Follow-up with   PCP in 1 week

## 2020-03-04 PROBLEM — R55 SYNCOPE: Status: RESOLVED | Noted: 2020-03-01 | Resolved: 2020-03-04

## 2020-07-16 ENCOUNTER — TELEPHONE (OUTPATIENT)
Dept: FAMILY MEDICINE CLINIC | Facility: CLINIC | Age: 55
End: 2020-07-16

## 2020-07-16 ENCOUNTER — TELEMEDICINE (OUTPATIENT)
Dept: TELEHEALTH | Age: 55
End: 2020-07-16

## 2020-07-16 ENCOUNTER — LAB ENCOUNTER (OUTPATIENT)
Dept: LAB | Facility: HOSPITAL | Age: 55
End: 2020-07-16
Attending: NURSE PRACTITIONER
Payer: COMMERCIAL

## 2020-07-16 DIAGNOSIS — J01.90 ACUTE SINUSITIS WITH SYMPTOMS > 10 DAYS: ICD-10-CM

## 2020-07-16 DIAGNOSIS — Z20.822 SUSPECTED COVID-19 VIRUS INFECTION: Primary | ICD-10-CM

## 2020-07-16 DIAGNOSIS — Z02.9 ENCOUNTERS FOR ADMINISTRATIVE PURPOSE: Primary | ICD-10-CM

## 2020-07-16 DIAGNOSIS — N76.0 ACUTE VAGINITIS: ICD-10-CM

## 2020-07-16 DIAGNOSIS — J45.41 MODERATE PERSISTENT ASTHMA WITH EXACERBATION: ICD-10-CM

## 2020-07-16 DIAGNOSIS — Z20.822 SUSPECTED COVID-19 VIRUS INFECTION: ICD-10-CM

## 2020-07-16 PROCEDURE — 99213 OFFICE O/P EST LOW 20 MIN: CPT | Performed by: NURSE PRACTITIONER

## 2020-07-16 RX ORDER — FLUCONAZOLE 150 MG/1
TABLET ORAL
Qty: 2 TABLET | Refills: 0 | Status: SHIPPED | OUTPATIENT
Start: 2020-07-16 | End: 2020-08-11

## 2020-07-16 RX ORDER — METHYLPREDNISOLONE 4 MG/1
TABLET ORAL
Qty: 21 TABLET | Refills: 0 | Status: SHIPPED | OUTPATIENT
Start: 2020-07-16 | End: 2020-08-11 | Stop reason: ALTCHOICE

## 2020-07-16 RX ORDER — AMOXICILLIN AND CLAVULANATE POTASSIUM 875; 125 MG/1; MG/1
1 TABLET, FILM COATED ORAL 2 TIMES DAILY
Qty: 20 TABLET | Refills: 0 | Status: SHIPPED | OUTPATIENT
Start: 2020-07-16 | End: 2020-07-26

## 2020-07-17 LAB — SARS-COV-2 RNA RESP QL NAA+PROBE: NOT DETECTED

## 2020-07-17 NOTE — TELEPHONE ENCOUNTER
Brisa Rivas is a 47year old female here today for a telemedicine/video visit. Virtual/Telephone Check-In    Brisa Rivas verbally consents to a Virtual/Telephone Check-In service on 07/16/20. Patient has been referred to the [facility] website at www. 875-125 MG Oral Tab Take 1 tablet by mouth 2 (two) times daily for 10 days.  20 tablet 0   • MONTELUKAST SODIUM 10 MG Oral Tab TAKE 1 TABLET BY MOUTH EVERY DAY EVERY NIGHT 90 tablet 0   • VERAPAMIL HCL  MG Oral Tab CR TAKE 1 TABLET (240 MG TOTAL) BY M tablet by mouth daily. • Omeprazole Magnesium (PRILOSEC OTC) 20 MG Oral Tab EC Take 20 mg by mouth daily.    14 tablet 3   • Peak Flow Meter (POCKET PEAK FLOW METER) Does not apply Device Use as needed 1 Device 0      Past Medical History:   Diagnosis D Grandfather    • Psychiatric Maternal Grandfather         suicide   • Genito-Urinary Disorder Sister         hysterectomy, fibroid   • Musculo-skelatal Disorder Sister         spinal surgery   • Arthritis Daughter         rheumatoid arthritis   • Asthma Da Dr. Santiago Coad if not improving with each day. Advised to go directly to the ED for any worsening of symptoms.     See Patient Instructions

## 2020-07-22 ENCOUNTER — TELEPHONE (OUTPATIENT)
Dept: FAMILY MEDICINE CLINIC | Facility: CLINIC | Age: 55
End: 2020-07-22

## 2020-09-23 ENCOUNTER — TELEMEDICINE (OUTPATIENT)
Dept: TELEHEALTH | Age: 55
End: 2020-09-23

## 2020-09-23 DIAGNOSIS — J02.9 PHARYNGITIS, UNSPECIFIED ETIOLOGY: Primary | ICD-10-CM

## 2020-09-23 PROCEDURE — 99213 OFFICE O/P EST LOW 20 MIN: CPT | Performed by: NURSE PRACTITIONER

## 2020-09-23 RX ORDER — AMOXICILLIN 500 MG/1
500 CAPSULE ORAL 2 TIMES DAILY
Qty: 20 CAPSULE | Refills: 0 | Status: SHIPPED | OUTPATIENT
Start: 2020-09-23 | End: 2020-10-03

## 2020-09-23 NOTE — PROGRESS NOTES
Virtual/Telephone Check-In    [address] [place] [address] Marely verbally consents to a Virtual/Telephone Check-In service on 09/23/20. Patient has been referred to the [facility] website at [email or web address] to review the yearly Consent to Treat document.   Patient unders • VALACYCLOVIR  MG Oral Tab TAKE 1 TABLET BY MOUTH TWICE A DAY (Patient taking differently: 500 mg daily.  Take BID x 7 days then take daily ) 60 tablet 5   • albuterol sulfate (2.5 MG/3ML) 0.083% Inhalation Nebu Soln Take 3 mL (2.5 mg total) by nebu EYES: Denies blurred vision or double vision  HENT: see hpi  CHEST: Denies chest pain, or palpitations  LUNGS: Denies shortness of breath, cough, or wheezing  GI: Denies abdominal pain, N/V/C/D.   MUSCULOSKELETAL: see hpi  LYMPH:  Denies lymphadenopathy  N Sore throats happen for many reasons, such as colds, allergies, cigarette smoke, air pollution, and infections caused by viruses or bacteria. In any case, your throat becomes red and sore.  Your goal for self-care is to reduce your discomfort while giving y · Limit contact with pets and with allergy-causing substances, such as pollen and mold. · Wash your hands often when you’re around someone with a sore throat or cold. This will keep viruses or bacteria from spreading.   · Limit outdoor time when air pollut Talk to your pediatrician regarding the use of this medicine in children. While this drug may be prescribed for selected conditions, precautions do apply. What side effects may I notice from receiving this medicine?   Side effects that you should report to What should I tell my health care provider before I take this medicine?   They need to know if you have any of these conditions:  · kidney disease  · an unusual or allergic reaction to amoxicillin, other penicillins, cephalosporin antibiotics, other medicin · Stay home. Call your healthcare provider and tell them you have symptoms of COVID-19. Do this before going to any hospital or clinic. Follow your provider's instructions. You may be advised to isolate yourself at home. This is called self-isolation.   · D · Stay home and start self-isolation. Don’t leave your home unless you need to get medical care. Don't go to work, school, or public areas. Don't use public transportation or taxis. · Follow all instructions from your healthcare provider.  Call your health · Getting rest. This helps your body fight the illness. · Staying hydrated. Drinking liquids is the best way to prevent dehydration. . Try to drink 6 to 8 glasses of liquids every day, or as advised by your provider.  Also check with your provider about whi · Don’t let anyone share household items with the sick person. This includes eating and drinking tools, towels, sheets, or blankets. · Clean fabrics and laundry thoroughly. · Keep other people and pets away from the sick person.     When you can stop self When to call your healthcare provider  Call your healthcare provider right away if a sick person has any of these:  · Trouble breathing  · Pain or pressure in chest  If a sick person has any of these, call 911:  · Trouble breathing that gets worse  · Pain Anyone who has been in close contact with someone who has COVID-19 should quarantine for at least 14 days from the time of exposure at home and follow the below recommendations. See recommendations below if COVID19 test is positive.     What counts as close 9. Avoid sharing personal items with other people in your household, like dishes, towels, and bedding   10. Clean all surfaces that are touched often, like counters, tabletops, and doorknobs.  Use household cleaning sprays or wipes according to the label in Please call your primary care provider within 2 days of your discharge to arrange for a telehealth follow-up.  CDC does not recommend repeat testing after a positive test.  Convalescent Plasma Donation Program  [address], in conjunction with [place] Centers for Disease Control & Prevention (CDC)  10 things you can do to manage your health at home, Tray.nl. pdf  PurchaseFilters.at Included in this visit, time may have been spent reviewing labs, medications, radiology tests and decision making. Appropriate medical decision-making and tests are ordered as detailed in the plan of care above.   Coding/billing information is submitted for

## 2020-09-23 NOTE — PATIENT INSTRUCTIONS
Self-Care for Sore Throats     Sore throats happen for many reasons, such as colds, allergies, cigarette smoke, air pollution, and infections caused by viruses or bacteria. In any case, your throat becomes red and sore.  Your goal for self-care is to redu · Limit contact with pets and with allergy-causing substances, such as pollen and mold. · Wash your hands often when you’re around someone with a sore throat or cold. This will keep viruses or bacteria from spreading.   · Limit outdoor time when air pollut Talk to your pediatrician regarding the use of this medicine in children. While this drug may be prescribed for selected conditions, precautions do apply. What side effects may I notice from receiving this medicine?   Side effects that you should report to What should I tell my health care provider before I take this medicine?   They need to know if you have any of these conditions:  · kidney disease  · an unusual or allergic reaction to amoxicillin, other penicillins, cephalosporin antibiotics, other medicin · Stay home. Call your healthcare provider and tell them you have symptoms of COVID-19. Do this before going to any hospital or clinic. Follow your provider's instructions. You may be advised to isolate yourself at home. This is called self-isolation.   · D · Stay home and start self-isolation. Don’t leave your home unless you need to get medical care. Don't go to work, school, or public areas. Don't use public transportation or taxis. · Follow all instructions from your healthcare provider.  Call your health · Getting rest. This helps your body fight the illness. · Staying hydrated. Drinking liquids is the best way to prevent dehydration. . Try to drink 6 to 8 glasses of liquids every day, or as advised by your provider.  Also check with your provider about whi · Don’t let anyone share household items with the sick person. This includes eating and drinking tools, towels, sheets, or blankets. · Clean fabrics and laundry thoroughly. · Keep other people and pets away from the sick person.     When you can stop self When to call your healthcare provider  Call your healthcare provider right away if a sick person has any of these:  · Trouble breathing  · Pain or pressure in chest  If a sick person has any of these, call 911:  · Trouble breathing that gets worse  · Pain Anyone who has been in close contact with someone who has COVID-19 should quarantine for at least 14 days from the time of exposure at home and follow the below recommendations. See recommendations below if COVID19 test is positive.     What counts as close 9. Avoid sharing personal items with other people in your household, like dishes, towels, and bedding   10. Clean all surfaces that are touched often, like counters, tabletops, and doorknobs.  Use household cleaning sprays or wipes according to the label in Please call your primary care provider within 2 days of your discharge to arrange for a telehealth follow-up.  CDC does not recommend repeat testing after a positive test.  Convalescent Plasma Donation Program  [address], in conjunction with [place] Centers for Disease Control & Prevention (CDC)  10 things you can do to manage your health at home, Tray.nl. pdf  PurchaseFilters.at

## 2020-09-24 ENCOUNTER — APPOINTMENT (OUTPATIENT)
Dept: LAB | Age: 55
End: 2020-09-24
Attending: NURSE PRACTITIONER
Payer: COMMERCIAL

## 2020-09-24 DIAGNOSIS — J02.9 PHARYNGITIS, UNSPECIFIED ETIOLOGY: ICD-10-CM

## 2020-09-26 LAB — SARS-COV-2 RNA RESP QL NAA+PROBE: NOT DETECTED

## 2020-10-09 ENCOUNTER — LAB ENCOUNTER (OUTPATIENT)
Dept: LAB | Facility: HOSPITAL | Age: 55
End: 2020-10-09
Attending: FAMILY MEDICINE
Payer: COMMERCIAL

## 2020-10-09 DIAGNOSIS — J01.90 ACUTE SINUSITIS: Primary | ICD-10-CM

## 2020-11-09 ENCOUNTER — TELEMEDICINE (OUTPATIENT)
Dept: TELEHEALTH | Age: 55
End: 2020-11-09

## 2020-11-09 DIAGNOSIS — Z20.822 ENCOUNTER BY TELEHEALTH FOR SUSPECTED COVID-19: Primary | ICD-10-CM

## 2020-11-09 PROCEDURE — 99213 OFFICE O/P EST LOW 20 MIN: CPT | Performed by: NURSE PRACTITIONER

## 2020-11-10 ENCOUNTER — APPOINTMENT (OUTPATIENT)
Dept: LAB | Age: 55
End: 2020-11-10
Attending: NURSE PRACTITIONER
Payer: COMMERCIAL

## 2020-11-10 DIAGNOSIS — Z20.822 ENCOUNTER BY TELEHEALTH FOR SUSPECTED COVID-19: ICD-10-CM

## 2020-11-11 NOTE — PROGRESS NOTES
CHIEF COMPLAINT:   \" Concerned that they may have COVID\"  HPI:   Henrry Savage is a 54year old female who presents via a Video Visit on Demand who just tested Negative for COVID.   Today, patient complains of gradually worsening chills, body aches, he Solution 1 spray by Nasal route 2 (two) times daily. 30 mL 12   • Triamcinolone Acetonide 55 MCG/ACT Nasal Aerosol 2 sprays by Nasal route as needed.    1 Inhaler 11   • Magnesium Cl-Calcium Carbonate (SLOW-MAG) 71.5-119 MG Oral Tab EC Three tabs po daily Disorder Daughter         spinal fusion   • Asthma Daughter    • PTSD Daughter    • Other (assualted) Daughter         assaulted   • Cancer Maternal Grandmother         uterine   • Substance Abuse Maternal Grandmother    • Neurological Disorder Maternal Gr

## 2020-11-30 PROBLEM — H93.19 TINNITUS, UNSPECIFIED LATERALITY: Status: ACTIVE | Noted: 2020-11-30

## 2020-12-28 ENCOUNTER — TELEMEDICINE (OUTPATIENT)
Dept: TELEHEALTH | Age: 55
End: 2020-12-28

## 2020-12-28 DIAGNOSIS — J01.00 ACUTE NON-RECURRENT MAXILLARY SINUSITIS: Primary | ICD-10-CM

## 2020-12-28 PROCEDURE — 99213 OFFICE O/P EST LOW 20 MIN: CPT | Performed by: NURSE PRACTITIONER

## 2020-12-28 RX ORDER — OLOPATADINE HYDROCHLORIDE 2 MG/ML
1 SOLUTION/ DROPS OPHTHALMIC DAILY
Qty: 1 BOTTLE | Refills: 0 | Status: SHIPPED | OUTPATIENT
Start: 2020-12-28 | End: 2021-01-11

## 2020-12-28 RX ORDER — CETIRIZINE HYDROCHLORIDE 10 MG/1
10 TABLET ORAL DAILY
COMMUNITY

## 2020-12-28 RX ORDER — AMOXICILLIN AND CLAVULANATE POTASSIUM 875; 125 MG/1; MG/1
1 TABLET, FILM COATED ORAL 2 TIMES DAILY
Qty: 20 TABLET | Refills: 0 | Status: SHIPPED | OUTPATIENT
Start: 2020-12-28 | End: 2021-01-07

## 2020-12-28 NOTE — PROGRESS NOTES
Virtual/Telephone Check-In    [address] [place] [address] Marely verbally consents to a Virtual/Telephone Check-In service on 12/28/20. Patient has been referred to the [facility] website at [email or web address] to review the yearly Consent to Treat document.   Patient unders CHIEF COMPLAINT:   No chief complaint on file. HPI:   Suzie Ramirez is a 54year old female who presents for a video visit. Patient reports left sided sinus congestion, ear pressure and sinus pressure.  Also reports left sided eye itching and sneez • Albuterol Sulfate  (90 Base) MCG/ACT Inhalation Aero Soln Inhale 2 puffs into the lungs every 6 (six) hours as needed for Wheezing or Shortness of Breath.      • albuterol sulfate (2.5 MG/3ML) 0.083% Inhalation Nebu Soln Take 3 mL (2.5 mg total) by • OSTEOCHONDRAL TALUS AUTOGRFT  11/30/2011    right ankle   • OTHER SURGICAL HISTORY Right     ankle surgery    • THYROIDECTOMY,MALIG,LTD NECK SURG     • UPPER GI ENDOSCOPY - REFERRAL  3/4/13 - [facility]    normal EGD.           Social History    Tobacco Use The sinuses are air-filled spaces within the bones of the face. They connect to the inside of the nose. Sinusitis is an inflammation of the tissue that lines the sinuses. Sinusitis can occur during a cold.  It can also happen due to allergies to pollens and · You can use an OTC decongestant, unless a similar medicine was prescribed to you. Nasal sprays work the fastest. Use one that contains phenylephrine or oxymetazoline. First blow your nose gently. Then use the spray.  Don't use these medicines more often t Call 911 if any of these occur:   · Seizure  · Trouble breathing  · Feeling dizzy or faint  · Fingernails, skin or lips look blue, purple , or gray  Prevention  Here are steps you can take to help prevent an infection:   · Keep good hand washing habits.   ·

## 2021-01-12 ENCOUNTER — APPOINTMENT (OUTPATIENT)
Dept: GENERAL RADIOLOGY | Age: 56
End: 2021-01-12
Attending: NURSE PRACTITIONER
Payer: COMMERCIAL

## 2021-01-12 ENCOUNTER — TELEMEDICINE (OUTPATIENT)
Dept: TELEHEALTH | Age: 56
End: 2021-01-12

## 2021-01-12 ENCOUNTER — HOSPITAL ENCOUNTER (OUTPATIENT)
Age: 56
Discharge: HOME | End: 2021-01-12
Payer: COMMERCIAL

## 2021-01-12 VITALS
HEART RATE: 67 BPM | HEIGHT: 63 IN | SYSTOLIC BLOOD PRESSURE: 104 MMHG | OXYGEN SATURATION: 98 % | WEIGHT: 175 LBS | DIASTOLIC BLOOD PRESSURE: 67 MMHG | RESPIRATION RATE: 16 BRPM | TEMPERATURE: 99 F | BODY MASS INDEX: 31.01 KG/M2

## 2021-01-12 DIAGNOSIS — S80.02XA CONTUSION OF LEFT KNEE, INITIAL ENCOUNTER: Primary | ICD-10-CM

## 2021-01-12 DIAGNOSIS — S60.229A CONTUSION OF HAND, UNSPECIFIED LATERALITY, INITIAL ENCOUNTER: ICD-10-CM

## 2021-01-12 DIAGNOSIS — Z02.9 ADMINISTRATIVE ENCOUNTER: Primary | ICD-10-CM

## 2021-01-12 PROCEDURE — 73080 X-RAY EXAM OF ELBOW: CPT | Performed by: NURSE PRACTITIONER

## 2021-01-12 PROCEDURE — 73560 X-RAY EXAM OF KNEE 1 OR 2: CPT | Performed by: NURSE PRACTITIONER

## 2021-01-12 PROCEDURE — 99499 UNLISTED E&M SERVICE: CPT | Performed by: NURSE PRACTITIONER

## 2021-01-12 PROCEDURE — 73130 X-RAY EXAM OF HAND: CPT | Performed by: NURSE PRACTITIONER

## 2021-01-12 PROCEDURE — 99213 OFFICE O/P EST LOW 20 MIN: CPT

## 2021-01-12 PROCEDURE — 99214 OFFICE O/P EST MOD 30 MIN: CPT

## 2021-01-12 RX ORDER — ACETAMINOPHEN 500 MG
1000 TABLET ORAL ONCE
Status: COMPLETED | OUTPATIENT
Start: 2021-01-12 | End: 2021-01-12

## 2021-01-12 NOTE — ED INITIAL ASSESSMENT (HPI)
Pt presents today with c/o fall onto concrete today. Pt denies hitting her head or any LOC. Pt has c/o pain to her left knee. Pt has a lump to her left knee.  Pt states that she is also having pain to her left hand- she has an abrasion to the palm of her ha

## 2021-01-12 NOTE — PROGRESS NOTES
Well nourished 53 yo female who suffered a fall 2 hours ago. She relates that she fell on both knees but left knee more painful and swollen. Braced fall with hands and + abrasions on both palms and Left shoulder in painful with movement.  She can walk wit

## 2021-01-13 NOTE — ED PROVIDER NOTES
Patient Seen in: [facility]      History   Patient presents with:  Knee Pain  Fall    Stated Complaint: TL - fell, left knee swollen, purple and painful.   April K    HPI/Subjective:   57-year-old pleasant female presents to immediate care right ankle   • OTHER SURGICAL HISTORY Right     ankle surgery    • THYROIDECTOMY,MASON,LTD NECK SURG     • TOTAL ABDOM HYSTERECTOMY     • UPPER GI ENDOSCOPY - REFERRAL  3/4/13 - KEITH Gaines    normal EGD.                  Social History    Tobacco Use      Smo Left hand: Normal.        Hands:         Legs:    Skin:     General: Skin is warm and dry. Capillary Refill: Capillary refill takes less than 2 seconds. Neurological:      General: No focal deficit present.       Mental Status: She is alert and or PROCEDURE:  XR HAND (MIN 3 VIEWS), LEFT (CPT=73130)  TECHNIQUE:  Three views were obtained. COMPARISON:  None.   INDICATIONS:  pain/injury  PATIENT STATED HISTORY: (As transcribed by Technologist)  Patient presents with left hand pain and soreness at the p 14-year-old female presents to immediate care for left knee pain, bilateral hand pain left elbow pain left shoulder pain after fall. Vital signs are stable at time of triage. X-rays did not reveal any acute fractures.   Patient is scheduled for shoulder M

## 2021-02-10 ENCOUNTER — TELEMEDICINE (OUTPATIENT)
Dept: TELEHEALTH | Age: 56
End: 2021-02-10

## 2021-02-10 ENCOUNTER — HOSPITAL ENCOUNTER (OUTPATIENT)
Age: 56
Discharge: HOME | End: 2021-02-10
Payer: COMMERCIAL

## 2021-02-10 VITALS
DIASTOLIC BLOOD PRESSURE: 62 MMHG | RESPIRATION RATE: 18 BRPM | TEMPERATURE: 99 F | SYSTOLIC BLOOD PRESSURE: 123 MMHG | OXYGEN SATURATION: 99 % | HEART RATE: 77 BPM

## 2021-02-10 DIAGNOSIS — J98.01 BRONCHOSPASM: Primary | ICD-10-CM

## 2021-02-10 DIAGNOSIS — Z20.822 ENCOUNTER FOR SCREENING LABORATORY TESTING FOR COVID-19 VIRUS: ICD-10-CM

## 2021-02-10 DIAGNOSIS — Z02.9 ENCOUNTERS FOR ADMINISTRATIVE PURPOSE: Primary | ICD-10-CM

## 2021-02-10 LAB — SARS-COV-2 RNA RESP QL NAA+PROBE: NOT DETECTED

## 2021-02-10 PROCEDURE — 94640 AIRWAY INHALATION TREATMENT: CPT | Performed by: NURSE PRACTITIONER

## 2021-02-10 PROCEDURE — 99214 OFFICE O/P EST MOD 30 MIN: CPT | Performed by: NURSE PRACTITIONER

## 2021-02-10 RX ORDER — PREDNISONE 20 MG/1
40 TABLET ORAL DAILY
Qty: 8 TABLET | Refills: 0 | Status: SHIPPED | OUTPATIENT
Start: 2021-02-10 | End: 2021-02-17

## 2021-02-10 RX ORDER — ALBUTEROL SULFATE 2.5 MG/3ML
2.5 SOLUTION RESPIRATORY (INHALATION) EVERY 4 HOURS PRN
Qty: 30 AMPULE | Refills: 0 | Status: SHIPPED | OUTPATIENT
Start: 2021-02-10 | End: 2021-02-23

## 2021-02-10 RX ORDER — PREDNISONE 20 MG/1
40 TABLET ORAL DAILY
Qty: 10 TABLET | Refills: 0 | Status: SHIPPED | OUTPATIENT
Start: 2021-02-10 | End: 2021-02-10

## 2021-02-10 RX ORDER — PREDNISONE 20 MG/1
40 TABLET ORAL ONCE
Status: COMPLETED | OUTPATIENT
Start: 2021-02-10 | End: 2021-02-10

## 2021-02-10 RX ORDER — ALBUTEROL SULFATE 90 UG/1
8 AEROSOL, METERED RESPIRATORY (INHALATION) ONCE
Status: COMPLETED | OUTPATIENT
Start: 2021-02-10 | End: 2021-02-10

## 2021-02-10 NOTE — ED PROVIDER NOTES
Patient Seen in: [facility]      History   Patient presents with:  Asthma    Stated Complaint: Flu like symptoms, wheezing     HPI/Subjective:   HPI  Patient is 59-year-old female past medical history of esophageal reflux, asthma, hypothy single oophorectomy (PCOS, hemorrhagic cyst)    • IMPACT TOOTH REM BONY W/COMP Bilateral 1983    wisdom teeth x 3   • NM THYROID I-131 THERAPY FOR HYPERTHYROIDISM INT  (DNR=68590)  2/5/2015    51 mc   • OSTEOCHONDRAL TALUS AUTOGRFT  11/30/2011    right ank is clear. Uvula midline. No pharyngeal swelling, oropharyngeal exudate, posterior oropharyngeal erythema or uvula swelling. Eyes:      General:         Right eye: No discharge. Left eye: No discharge.       Conjunctiva/sclera: Conjunctivae normal. patient rest and drink plenty fluids.   Follow-up with her PCP or pulmonologist to seek immediate medical attention if symptoms worsen                               Disposition and Plan     Clinical Impression:  Bronchospasm  (primary encounter diagnosis)

## 2021-02-10 NOTE — ED INITIAL ASSESSMENT (HPI)
Pt began 5 days ago with headache, body aches and congestion , with wheezing from her asthma on day 1. She got a [facility] test Sunday.   Body aches are gone, but she continues with wheezing and using her asthma inhaler more than normal

## 2021-02-10 NOTE — PROGRESS NOTES
Virtual/Telephone Check-In    [address] [place] [address] Marely verbally consents to a Virtual/Telephone Check-In service on 02/10/21. Patient has been referred to the [facility] website at [email or web address] to review the yearly Consent to Treat document.   Patient unders COMPLAINT:  No chief complaint on file. HPI:   Rosendo Ni is a 54year old female who presents for a video visit. Patient reports flu like symptoms for 5 days. Started with body aches and sinus congestion. Mild headaches. Fever of 99.2.  No sore t INSTILL 1-2 SPRAYS INTRANASALLY 3 TIMES A DAY AS NEEDED 1 Bottle 1   • Saline Nasal Spray 0.65 % Nasal Solution 1 spray by Nasal route 2 (two) times daily. 30 mL 12   • Triamcinolone Acetonide 55 MCG/ACT Nasal Aerosol 2 sprays by Nasal route as needed. Alcohol use: No      Comment: in AA sobriety since 1999    Drug use: No     REVIEW OF SYSTEMS:   GENERAL: normal appetite   SKIN: no rashes or abnormal skin lesions   HEENT: See HPI   LUNGS: denies shortness of breath or wheezing, See HPI   CARDIOVASCU

## 2021-03-12 DIAGNOSIS — Z23 NEED FOR VACCINATION: ICD-10-CM

## 2021-03-13 ENCOUNTER — TELEMEDICINE (OUTPATIENT)
Dept: TELEHEALTH | Age: 56
End: 2021-03-13

## 2021-03-13 DIAGNOSIS — H10.10 ALLERGIC CONJUNCTIVITIS, UNSPECIFIED LATERALITY: Primary | ICD-10-CM

## 2021-03-13 PROCEDURE — 99213 OFFICE O/P EST LOW 20 MIN: CPT | Performed by: NURSE PRACTITIONER

## 2021-03-13 RX ORDER — POLYMYXIN B SULFATE AND TRIMETHOPRIM 1; 10000 MG/ML; [USP'U]/ML
SOLUTION OPHTHALMIC
Qty: 10 ML | Refills: 0 | Status: SHIPPED | OUTPATIENT
Start: 2021-03-13 | End: 2021-08-07

## 2021-03-13 NOTE — PATIENT INSTRUCTIONS
Conjunctivitis, Allergic    Conjunctivitis is an irritation of the thin membrane covering the eye and the inside of the eyelid. This membrane is called the conjunctiva. The condition is often called pink eye or red eye because the eye looks pink or red. medical care right away if any of these occur:   · Increased eyelid swelling  · New or worsening drainage from the eye  · Increasing redness around the eye  · Facial swelling  Jazmine last reviewed this educational content on 4/1/2020  © 3084-3336 [facility]

## 2021-03-13 NOTE — PROGRESS NOTES
Regina Bartlett is a 54year old female. CHIEF COMPLAINT:   Patient presents with:  Eye Problem: left eye red and watering    Encounter was conducted by video visit.      HPI:   Regina Bartlett is a 54year old female who presents with chief complaint of lungs every 4 (four) hours as needed.  1 Inhaler 1   • BREO ELLIPTA 100-25 MCG/INH Inhalation Aerosol Powder, Breath Activated TAKE 1 PUFF BY MOUTH EVERY  each 0   • MONTELUKAST SODIUM 10 MG Oral Tab TAKE 1 TABLET BY MOUTH EVERY DAY EVERY NIGHT 90 ta 3/1/2020      Past Surgical History:   Procedure Laterality Date   • BIOPSY OF THYROID,PERCUT  6/19/2012   • BIOPSY OF THYROID,PERCUT  4/2013   • COLONOSCOPY  3/4/13 - KEITH Gaines    hemorrhoids, repeat 2023 w/MAC.    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  See HPI  HENT: denies ear pain, congestion, sore throat  LUNG symptoms. Allergens that cause eye irritation include things such as house dust, smoke, or pollen in the air. This can occur seasonally, most often in the spring.  Other possible allergens that may cause symptoms include cosmetics, perfumes, animal saliva o

## 2021-03-25 ENCOUNTER — TELEMEDICINE (OUTPATIENT)
Dept: INTERNAL MEDICINE CLINIC | Facility: CLINIC | Age: 56
End: 2021-03-25

## 2021-03-25 ENCOUNTER — TELEPHONE (OUTPATIENT)
Dept: INTERNAL MEDICINE CLINIC | Facility: CLINIC | Age: 56
End: 2021-03-25

## 2021-03-25 VITALS — HEIGHT: 62.5 IN | WEIGHT: 177 LBS | BODY MASS INDEX: 31.76 KG/M2

## 2021-03-25 DIAGNOSIS — I10 ESSENTIAL HYPERTENSION: ICD-10-CM

## 2021-03-25 DIAGNOSIS — E89.0 S/P THYROIDECTOMY: ICD-10-CM

## 2021-03-25 DIAGNOSIS — E66.09 CLASS 1 OBESITY DUE TO EXCESS CALORIES WITH SERIOUS COMORBIDITY IN ADULT, UNSPECIFIED BMI: Primary | ICD-10-CM

## 2021-03-25 DIAGNOSIS — K21.9 GASTROESOPHAGEAL REFLUX DISEASE, UNSPECIFIED WHETHER ESOPHAGITIS PRESENT: Primary | ICD-10-CM

## 2021-03-25 DIAGNOSIS — E66.09 CLASS 1 OBESITY DUE TO EXCESS CALORIES WITH SERIOUS COMORBIDITY IN ADULT, UNSPECIFIED BMI: ICD-10-CM

## 2021-03-25 DIAGNOSIS — Z51.81 THERAPEUTIC DRUG MONITORING: ICD-10-CM

## 2021-03-25 PROCEDURE — 3008F BODY MASS INDEX DOCD: CPT | Performed by: INTERNAL MEDICINE

## 2021-03-25 PROCEDURE — 99204 OFFICE O/P NEW MOD 45 MIN: CPT | Performed by: INTERNAL MEDICINE

## 2021-03-25 RX ORDER — NALTREXONE HYDROCHLORIDE AND BUPROPION HYDROCHLORIDE 8; 90 MG/1; MG/1
2 TABLET, EXTENDED RELEASE ORAL 2 TIMES DAILY
Qty: 120 TABLET | Refills: 0 | Status: SHIPPED | OUTPATIENT
Start: 2021-03-25 | End: 2021-03-25

## 2021-03-25 RX ORDER — NALTREXONE HYDROCHLORIDE AND BUPROPION HYDROCHLORIDE 8; 90 MG/1; MG/1
2 TABLET, EXTENDED RELEASE ORAL 2 TIMES DAILY
Qty: 120 TABLET | Refills: 0 | Status: SHIPPED | OUTPATIENT
Start: 2021-03-25 | End: 2021-04-24

## 2021-03-25 NOTE — PROGRESS NOTES
HISTORY OF PRESENT ILLNESS  Patient presents with:  Weight Problem: no previous meds      Fidel Gamez is a 54year old female new to our office today for initiation of medical weight loss program.  Patient presents today with c/o excess weight.      Jena Grayson Cardiac disorders:negative    Depression/anxiety: negative   Glaucoma: negative   Kidney stones: negative   Eating disorder: negative   Migraines: YES   Seizures: negative   Joint-related conditions: YES   Liver disease: negative   Renal disease: negative CA 9.3 12/23/2020    OSMOCALC 291 03/02/2020    ALKPHO 89 12/23/2020    AST 22 12/23/2020    ALT 19 12/23/2020    BILT 0.39 12/23/2020    TP 6.9 12/23/2020    ALB 4.3 12/23/2020    GLOBULIN 3.8 03/01/2020    AGRATIO 2.0 03/19/2015     12/23/2020 10 MG Oral Tab, Take 10 mg by mouth daily. , Disp: , Rfl:   EPINEPHrine HCl 1 MG/ML Injection Solution, Inject 0.3 mg into the skin., Disp: , Rfl:   AZELASTINE HCL 0.1 % Nasal Solution, USE 2 SPRAYS INTRANASALLY TWICE A DAY, Disp: 1 Bottle, Rfl: 11  Jennifer serious comorbidity in adult, unspecified BMI    Other orders  -     Naltrexone-buPROPion HCl ER (CONTRAVE) 8-90 MG Oral Tablet 12 Hr; Take 2 tablets by mouth 2 (two) times daily. PLAN  · Medication use and side effects reviewed with patient.   Medic Healthy snacks: peanut butter and apples, hummus and carrots, yogurt and berries, nuts (1/4 cup), tuna and crackers    Premier protein shakes  Quest bars  Power crunch bars   Siggi yogurt (9% milk fat)   [place] balanced breaks (cheese and nuts)- without behind why we overeat    PODCASTS   Food Psych with Tyrell Deck   Losing 100 lbs with Corinne   Brain over Binge by Kermitt Essex         Return in about 4 weeks (around 4/22/2021) for weight management. Patient verbalizes understanding.     [address]

## 2021-03-25 NOTE — TELEPHONE ENCOUNTER
I called patient to discuss. She said she does not have hypertension.   I explained that it is listed from her current conditions since 2016 - we had no part in diagnosing her with that and since it is a condition linked to obesity it is in her AVS.  She w

## 2021-03-25 NOTE — TELEPHONE ENCOUNTER
Can refer to Fransisca Ovens   Also can give information for Zoe Azam   I obviously did not check her blood pressure but HTN is an OBESITY comorbid condition thus will improve with dietary changes and weight loss as discussed during OV.

## 2021-03-25 NOTE — TELEPHONE ENCOUNTER
Pt called stated she her her intitial consult today and has viewed her AVS pt has a question regarding what is listed as essential  Hypertension pt would like to know what that means as she does not have hypertension - pt has had low blood pressure    Pt a

## 2021-03-25 NOTE — PATIENT INSTRUCTIONS
CONTRAVE DOSE TAPER contraTake one tablet by mouth evening x 1 week  Take one tablet by mouth twice a day x 1 week  Take 2 tablets in the evening and one tablet in the morning x 1 week  Take 2 tablets in the morning and 2 tablets in the evening    Plan:  C PROTEIN SNACK IDEAS  -cottage cheese  -plain yogurt  -kefir  -hard-boiled eggs  -natural cheeses  -nuts (measure portion size)   -unsweetened nut butters  -dried edamame   -archie seeds soaked in water or almond milk  -soy nuts  -cured meats (monitor for sod

## 2021-03-26 NOTE — TELEPHONE ENCOUNTER
PA for contrave requested with [facility] at [phone]  Patient ID is [id number]    Ht 5' 2.5\" (1.588 m)   Wt 177 lb (80.3 kg)   LMP  (LMP Unknown)   BMI 31.86 kg/m² ,   Class 1 obesity due to excess calories with serious comorbidity in adult, unspe

## 2021-03-29 ENCOUNTER — NURSE ONLY (OUTPATIENT)
Dept: INTERNAL MEDICINE CLINIC | Facility: CLINIC | Age: 56
End: 2021-03-29
Payer: COMMERCIAL

## 2021-03-29 RX ORDER — NALTREXONE HYDROCHLORIDE AND BUPROPION HYDROCHLORIDE 8; 90 MG/1; MG/1
TABLET, EXTENDED RELEASE ORAL
Qty: 7 TABLET | Refills: 0 | COMMUNITY
Start: 2021-03-29 | End: 2021-12-28

## 2021-04-06 RX ORDER — NALTREXONE HYDROCHLORIDE AND BUPROPION HYDROCHLORIDE 8; 90 MG/1; MG/1
TABLET, EXTENDED RELEASE ORAL
Qty: 120 TABLET | Refills: 0 | Status: SHIPPED | OUTPATIENT
Start: 2021-04-06 | End: 2021-05-06

## 2021-04-06 NOTE — TELEPHONE ENCOUNTER
I called patient . She wants more samples of contrave. I asked her if she found out if her med card will pay for it and she has to call me back.

## 2021-04-06 NOTE — TELEPHONE ENCOUNTER
I spoke with patient and she took her last contrave today. She needs samples and wants order sent to pharmacy. Okay for both per Dr. Deysi Nevarez.   Rx sent to [id number] [facility] free # is [phone]  I put her address and phone number on the RX and they wi

## 2021-04-07 ENCOUNTER — NURSE ONLY (OUTPATIENT)
Dept: INTERNAL MEDICINE CLINIC | Facility: CLINIC | Age: 56
End: 2021-04-07
Payer: COMMERCIAL

## 2021-04-23 ENCOUNTER — HOSPITAL ENCOUNTER (OUTPATIENT)
Age: 56
Discharge: HOME | End: 2021-04-23
Payer: COMMERCIAL

## 2021-04-30 ENCOUNTER — TELEPHONE (OUTPATIENT)
Dept: INTERNAL MEDICINE CLINIC | Facility: CLINIC | Age: 56
End: 2021-04-30

## 2021-05-05 NOTE — TELEPHONE ENCOUNTER
I never heard back from patient after reaching out. I have pended a letter if you feel it is appropriate, please sign.

## 2021-05-06 ENCOUNTER — TELEMEDICINE (OUTPATIENT)
Dept: INTERNAL MEDICINE CLINIC | Facility: CLINIC | Age: 56
End: 2021-05-06

## 2021-05-06 ENCOUNTER — TELEPHONE (OUTPATIENT)
Dept: INTERNAL MEDICINE CLINIC | Facility: CLINIC | Age: 56
End: 2021-05-06

## 2021-05-06 DIAGNOSIS — Z51.81 THERAPEUTIC DRUG MONITORING: Primary | ICD-10-CM

## 2021-05-06 DIAGNOSIS — E66.09 CLASS 1 OBESITY DUE TO EXCESS CALORIES WITH SERIOUS COMORBIDITY IN ADULT, UNSPECIFIED BMI: ICD-10-CM

## 2021-05-06 DIAGNOSIS — R82.998 DARK URINE: ICD-10-CM

## 2021-05-06 PROCEDURE — 99213 OFFICE O/P EST LOW 20 MIN: CPT | Performed by: INTERNAL MEDICINE

## 2021-05-06 RX ORDER — NALTREXONE HYDROCHLORIDE AND BUPROPION HYDROCHLORIDE 8; 90 MG/1; MG/1
TABLET, EXTENDED RELEASE ORAL
Qty: 120 TABLET | Refills: 0 | Status: SHIPPED | OUTPATIENT
Start: 2021-05-06 | End: 2021-07-15

## 2021-05-06 NOTE — PROGRESS NOTES
HISTORY OF PRESENT ILLNESS  Patient presents with:  Weight Check: video       Thuy Medina is a 54year old female here for follow up in medical weight loss program.     Denies chest pain, shortness of breath, dizziness, blurred vision, headache, pares 03/02/2020    GFR 82 03/05/2016    GFRNAA 101 03/02/2020    GFRAA 116 03/02/2020    CA 9.3 12/23/2020    OSMOCALC 291 03/02/2020    ALKPHO 89 12/23/2020    AST 22 12/23/2020    ALT 19 12/23/2020    BILT 0.39 12/23/2020    TP 6.9 12/23/2020    ALB 4.3 12/23 0  MONTELUKAST SODIUM 10 MG Oral Tab, TAKE 1 TABLET BY MOUTH EVERY DAY EVERY NIGHT, Disp: 90 tablet, Rfl: 3  cetirizine 10 MG Oral Tab, Take 10 mg by mouth daily. , Disp: , Rfl:   EPINEPHrine HCl 1 MG/ML Injection Solution, Inject 0.3 mg into the skin., Dis ROUTINE; Future    Dark urine  -     COMP METABOLIC PANEL (14);  Future    Other orders  -     Naltrexone-buPROPion HCl ER (CONTRAVE) 8-90 MG Oral Tablet 12 Hr; Week 1: 1 tablet in AM. Week 2: 1 tablet in AM and PM. Week 3: 2 tablets in AM, 1 tablet in PM.

## 2021-05-10 NOTE — TELEPHONE ENCOUNTER
Patient called to check on her message and said she could not reply. I advised we can fax, mail or she can . She would like letter mailed to her. Will have staff print out and give to Dr. Garett Garcia to sign and place in mail. Address verified.

## 2021-05-21 ENCOUNTER — LAB ENCOUNTER (OUTPATIENT)
Dept: LAB | Age: 56
End: 2021-05-21
Attending: INTERNAL MEDICINE
Payer: COMMERCIAL

## 2021-05-21 ENCOUNTER — TELEPHONE (OUTPATIENT)
Dept: INTERNAL MEDICINE CLINIC | Facility: CLINIC | Age: 56
End: 2021-05-21

## 2021-05-21 DIAGNOSIS — E66.09 CLASS 1 OBESITY DUE TO EXCESS CALORIES WITH SERIOUS COMORBIDITY IN ADULT, UNSPECIFIED BMI: ICD-10-CM

## 2021-05-21 DIAGNOSIS — Z51.81 THERAPEUTIC DRUG MONITORING: ICD-10-CM

## 2021-05-21 DIAGNOSIS — R82.998 DARK URINE: ICD-10-CM

## 2021-05-21 PROCEDURE — 81003 URINALYSIS AUTO W/O SCOPE: CPT | Performed by: INTERNAL MEDICINE

## 2021-05-21 PROCEDURE — 80053 COMPREHEN METABOLIC PANEL: CPT | Performed by: INTERNAL MEDICINE

## 2021-05-21 NOTE — TELEPHONE ENCOUNTER
Patient would like to know if she must fast for her labs tomorrow. CMP and urine.   We are checking liver and kidneys so no need to fast.    Patient notified

## 2021-07-15 ENCOUNTER — OFFICE VISIT (OUTPATIENT)
Dept: INTERNAL MEDICINE CLINIC | Facility: CLINIC | Age: 56
End: 2021-07-15
Payer: COMMERCIAL

## 2021-07-15 VITALS
HEART RATE: 68 BPM | BODY MASS INDEX: 29.07 KG/M2 | RESPIRATION RATE: 16 BRPM | HEIGHT: 62.5 IN | SYSTOLIC BLOOD PRESSURE: 118 MMHG | DIASTOLIC BLOOD PRESSURE: 68 MMHG | WEIGHT: 162 LBS

## 2021-07-15 DIAGNOSIS — E66.09 CLASS 1 OBESITY DUE TO EXCESS CALORIES WITH SERIOUS COMORBIDITY IN ADULT, UNSPECIFIED BMI: ICD-10-CM

## 2021-07-15 DIAGNOSIS — Z51.81 THERAPEUTIC DRUG MONITORING: Primary | ICD-10-CM

## 2021-07-15 PROCEDURE — 99214 OFFICE O/P EST MOD 30 MIN: CPT | Performed by: INTERNAL MEDICINE

## 2021-07-15 PROCEDURE — 3074F SYST BP LT 130 MM HG: CPT | Performed by: INTERNAL MEDICINE

## 2021-07-15 PROCEDURE — 3008F BODY MASS INDEX DOCD: CPT | Performed by: INTERNAL MEDICINE

## 2021-07-15 PROCEDURE — 3078F DIAST BP <80 MM HG: CPT | Performed by: INTERNAL MEDICINE

## 2021-07-15 RX ORDER — NALTREXONE HYDROCHLORIDE AND BUPROPION HYDROCHLORIDE 8; 90 MG/1; MG/1
TABLET, EXTENDED RELEASE ORAL
Qty: 120 TABLET | Refills: 2 | Status: SHIPPED | OUTPATIENT
Start: 2021-07-15 | End: 2021-10-05

## 2021-07-15 NOTE — PROGRESS NOTES
HISTORY OF PRESENT ILLNESS  Patient presents with:  Weight Check: 1st time in office, down 15 lb from home scale      Huma Arvizu is a 54year old female here for follow up in medical weight loss program.     Denies chest pain, shortness of breath, di GFRAA 84 05/21/2021    CA 9.3 05/21/2021    OSMOCALC 291 05/21/2021    ALKPHO 88 05/21/2021    AST 16 05/21/2021    ALT 23 05/21/2021    BILT 0.4 05/21/2021    TP 7.5 05/21/2021    ALB 4.0 05/21/2021    GLOBULIN 3.5 05/21/2021    AGRATIO 2.0 03/19/2015 3  cetirizine 10 MG Oral Tab, Take 10 mg by mouth daily. , Disp: , Rfl:   EPINEPHrine HCl 1 MG/ML Injection Solution, Inject 0.3 mg into the skin., Disp: , Rfl:   AZELASTINE HCL 0.1 % Nasal Solution, USE 2 SPRAYS INTRANASALLY TWICE A DAY, Disp: 1 Bottle, Rf day        PLAN  · Nutrition: low carb diet/ recommended to eat breakfast daily/ regular protein intake  · Medication use and side effects reviewed with patient.   Medication contraindications: n/a  · Total time spent on chart review, pre-charting, obtainin

## 2021-07-16 ENCOUNTER — LAB ENCOUNTER (OUTPATIENT)
Dept: LAB | Age: 56
End: 2021-07-16
Attending: INTERNAL MEDICINE
Payer: COMMERCIAL

## 2021-07-16 DIAGNOSIS — C73 THYROID CANCER (HCC): ICD-10-CM

## 2021-07-16 DIAGNOSIS — Z51.81 ENCOUNTER FOR THERAPEUTIC DRUG MONITORING: Primary | ICD-10-CM

## 2021-07-16 DIAGNOSIS — E66.09 EXOGENOUS OBESITY: ICD-10-CM

## 2021-07-16 DIAGNOSIS — E03.9 ACQUIRED HYPOTHYROIDISM: ICD-10-CM

## 2021-07-16 LAB
CRP SERPL HS-MCNC: 1.56 MG/L (ref ?–3)
TSI SER-ACNC: 0.52 MIU/ML (ref 0.36–3.74)

## 2021-07-16 PROCEDURE — 86800 THYROGLOBULIN ANTIBODY: CPT

## 2021-07-16 PROCEDURE — 86141 C-REACTIVE PROTEIN HS: CPT

## 2021-07-16 PROCEDURE — 84432 ASSAY OF THYROGLOBULIN: CPT

## 2021-07-16 PROCEDURE — 36415 COLL VENOUS BLD VENIPUNCTURE: CPT

## 2021-07-16 PROCEDURE — 84443 ASSAY THYROID STIM HORMONE: CPT

## 2021-07-18 LAB
THYROGLOBULIN AB: <0.9 IU/ML
THYROGLOBULIN, SERUM OR PLASMA: <0.1 NG/ML

## 2021-08-07 ENCOUNTER — HOSPITAL ENCOUNTER (OUTPATIENT)
Age: 56
Discharge: HOME | End: 2021-08-07
Payer: COMMERCIAL

## 2021-08-07 VITALS
DIASTOLIC BLOOD PRESSURE: 52 MMHG | OXYGEN SATURATION: 98 % | SYSTOLIC BLOOD PRESSURE: 116 MMHG | TEMPERATURE: 99 F | WEIGHT: 161 LBS | RESPIRATION RATE: 20 BRPM | HEART RATE: 81 BPM | BODY MASS INDEX: 28.53 KG/M2 | HEIGHT: 63 IN

## 2021-08-07 DIAGNOSIS — J45.21 MILD INTERMITTENT ASTHMA WITH EXACERBATION: ICD-10-CM

## 2021-08-07 DIAGNOSIS — J06.9 UPPER RESPIRATORY TRACT INFECTION, UNSPECIFIED TYPE: Primary | ICD-10-CM

## 2021-08-07 PROCEDURE — 99213 OFFICE O/P EST LOW 20 MIN: CPT

## 2021-08-07 RX ORDER — BENZONATATE 100 MG/1
100 CAPSULE ORAL 3 TIMES DAILY PRN
Qty: 30 CAPSULE | Refills: 0 | Status: SHIPPED | OUTPATIENT
Start: 2021-08-07 | End: 2021-09-06

## 2021-08-07 RX ORDER — ALBUTEROL SULFATE 2.5 MG/3ML
2.5 SOLUTION RESPIRATORY (INHALATION) EVERY 4 HOURS PRN
Qty: 1 EACH | Refills: 0 | Status: SHIPPED | OUTPATIENT
Start: 2021-08-07 | End: 2021-09-06

## 2021-08-07 RX ORDER — METHYLPREDNISOLONE 4 MG/1
TABLET ORAL
Qty: 1 EACH | Refills: 0 | Status: SHIPPED | OUTPATIENT
Start: 2021-08-07 | End: 2021-08-16 | Stop reason: ALTCHOICE

## 2021-08-07 RX ORDER — NEBULIZER AND COMPRESSOR
EACH MISCELLANEOUS
Qty: 1 KIT | Refills: 0 | Status: SHIPPED | OUTPATIENT
Start: 2021-08-07

## 2021-08-07 NOTE — ED PROVIDER NOTES
Patient Seen in: [facility]      History   Patient presents with:  Sinus Problem  Cough    Stated Complaint: sore throat, cough, ha, congestion, and postnasal drip x 4 days    HPI/Subjective:   HPI  Patient is 22-year-old female past medi 11/30/2011    right ankle   • OTHER SURGICAL HISTORY Right     ankle surgery    • THYROIDECTOMY,MALIG,LTD NECK SURG     • TOTAL ABDOM HYSTERECTOMY     • UPPER GI ENDOSCOPY - REFERRAL  3/4/13 - KEITH Gaines    normal EGD.                  Social History    Tobacco Capillary refill takes less than 2 seconds. Neurological:      Mental Status: She is alert and oriented to person, place, and time.    Psychiatric:         Mood and Affect: Mood normal.         Behavior: Behavior normal.                 ED Course   Labs R

## 2021-08-07 NOTE — ED INITIAL ASSESSMENT (HPI)
Patient here for evaluation of a productive cough with thick yellow mucus x 2-3 days.  States 3-4 days ago she started with a sore throat and headache, that have resolved, but now has the cough, sinus pressure/congestion, and is here for a prescription for

## 2021-08-30 ENCOUNTER — TELEPHONE (OUTPATIENT)
Dept: ORTHOPEDICS CLINIC | Facility: CLINIC | Age: 56
End: 2021-08-30

## 2021-08-30 NOTE — TELEPHONE ENCOUNTER
Future Appointments   Date Time Provider Natalio White   9/13/2021  4:30 PM Radha Escobedo MD [facility]     · Pt is coming in for LEFT wrist pain - pt fell 1 year ago, did physical therapy, is currently wearing a brace - pt states it is

## 2021-09-13 ENCOUNTER — OFFICE VISIT (OUTPATIENT)
Dept: ORTHOPEDICS CLINIC | Facility: CLINIC | Age: 56
End: 2021-09-13
Payer: COMMERCIAL

## 2021-09-13 VITALS — HEART RATE: 76 BPM | OXYGEN SATURATION: 99 %

## 2021-09-13 DIAGNOSIS — M25.532 LEFT WRIST PAIN: Primary | ICD-10-CM

## 2021-09-13 PROCEDURE — 99203 OFFICE O/P NEW LOW 30 MIN: CPT | Performed by: ORTHOPAEDIC SURGERY

## 2021-09-14 ENCOUNTER — HOSPITAL ENCOUNTER (OUTPATIENT)
Age: 56
Discharge: HOME | End: 2021-09-14
Payer: COMMERCIAL

## 2021-09-14 ENCOUNTER — APPOINTMENT (OUTPATIENT)
Dept: GENERAL RADIOLOGY | Age: 56
End: 2021-09-14
Attending: PHYSICIAN ASSISTANT
Payer: COMMERCIAL

## 2021-09-14 VITALS
BODY MASS INDEX: 28 KG/M2 | RESPIRATION RATE: 18 BRPM | HEART RATE: 79 BPM | DIASTOLIC BLOOD PRESSURE: 57 MMHG | SYSTOLIC BLOOD PRESSURE: 131 MMHG | TEMPERATURE: 98 F | WEIGHT: 156 LBS | OXYGEN SATURATION: 98 %

## 2021-09-14 DIAGNOSIS — S90.31XA CONTUSION OF RIGHT FOOT, INITIAL ENCOUNTER: Primary | ICD-10-CM

## 2021-09-14 PROCEDURE — 73630 X-RAY EXAM OF FOOT: CPT | Performed by: PHYSICIAN ASSISTANT

## 2021-09-14 PROCEDURE — 99213 OFFICE O/P EST LOW 20 MIN: CPT

## 2021-09-15 NOTE — ED INITIAL ASSESSMENT (HPI)
Pt aox4. Pt c/o rt foot pain. Pt states dropped a lap top on rt foot at 1700 today. Pt has edema and bruising below rt 5th toe.

## 2021-09-15 NOTE — ED PROVIDER NOTES
Patient Seen in: [facility] [place]      History   Patient presents with:  Leg or Foot Injury    Stated Complaint: Right foot injury    Subjective:   JALEESA Comer Britton is a 59-year-old female who comes in today complaining of right lateral foot pain Review of Systems    Positive for stated complaint: Right foot injury  Other systems are as noted in HPI. Constitutional and vital signs reviewed. All other systems reviewed and negative except as noted above.     Physical Exam     ED Triage Vit XR FOOT, COMPLETE (MIN 3 VIEWS), RIGHT (CPT=73630)  TECHNIQUE:  AP, oblique, and lateral views were obtained. COMPARISON:  None.   INDICATIONS:  Right foot injury  PATIENT STATED HISTORY: (As transcribed by Technologist)  Right 5th metatarsal pain after dr any persistent conditions. I've explained the importance of following up with her doctor- Dominik Juan MD  - as instructed. The patient verbalized understanding of the discharge instructions and plan.

## 2021-09-20 NOTE — TELEPHONE ENCOUNTER
Requesting  Requested Prescriptions     Pending Prescriptions Disp Refills   • Naltrexone-buPROPion HCl ER (CONTRAVE) 8-90 MG Oral Tablet 12 Hr [Pharmacy Med Name: Contrave 8 mg-90 mg tablet,extended release] 120 tablet 2     Sig: TAKE 2 TABLETS BY MOUTH T

## 2021-09-22 ENCOUNTER — OFFICE VISIT (OUTPATIENT)
Dept: ORTHOPEDICS CLINIC | Facility: CLINIC | Age: 56
End: 2021-09-22
Payer: COMMERCIAL

## 2021-09-22 VITALS — OXYGEN SATURATION: 100 % | HEART RATE: 87 BPM

## 2021-09-22 DIAGNOSIS — M25.512 CHRONIC LEFT SHOULDER PAIN: Primary | ICD-10-CM

## 2021-09-22 DIAGNOSIS — G89.29 CHRONIC LEFT SHOULDER PAIN: Primary | ICD-10-CM

## 2021-09-22 PROCEDURE — 99203 OFFICE O/P NEW LOW 30 MIN: CPT | Performed by: ORTHOPAEDIC SURGERY

## 2021-09-24 NOTE — H&P
[facility]  [facility] [place] [facility] [place] [address] [phone]  [phone]     NEW PATIENT VISIT - HISTORY AND PHYSICAL EXAMINATION     Name: Thad Donaldson   MRN: [id number]  Date: 9/22/2021     CC: Left should HYPERTHYROIDISM INT  (CPT=79005)  2/5/2015    51 mc   • OSTEOCHONDRAL TALUS AUTOGRFT  11/30/2011    right ankle   • OTHER SURGICAL HISTORY Right     ankle surgery    • THYROIDECTOMY,MALIG,LTD NECK SURG     • TOTAL ABDOM HYSTERECTOMY     • UPPER GI ENDOSCOP Please dispense nebulizer kit plus tubing 1 kit 0   • Naltrexone-buPROPion HCl ER (CONTRAVE) 8-90 MG Oral Tablet 12 Hr 2 tablets twice a day 120 tablet 2   • Galcanezumab-gnlm (EMGALITY) 120 MG/ML Subcutaneous Solution Auto-injector Inject 120 mg into the review of systems was performed and was negative aside from the aforementioned per history of present illness. SOCIAL HX:  Patient lives with her  and 51-year-old daughter. She enjoys singing. She works as an .   Denies a 5/5 EPL, Finger Abduction, , Pinch, Deltoid  Sensation:   intact to light touch median, ulnar, radial and axillary nerve  Circulation:   Normal, 2+ radial pulse    The contralateral upper extremity is without limitation in range of motion or strengt

## 2021-10-05 RX ORDER — NALTREXONE HYDROCHLORIDE AND BUPROPION HYDROCHLORIDE 8; 90 MG/1; MG/1
TABLET, EXTENDED RELEASE ORAL
Qty: 120 TABLET | Refills: 2 | Status: SHIPPED | OUTPATIENT
Start: 2021-10-05 | End: 2021-12-28

## 2021-10-25 ENCOUNTER — TELEMEDICINE (OUTPATIENT)
Dept: INTERNAL MEDICINE CLINIC | Facility: CLINIC | Age: 56
End: 2021-10-25

## 2021-10-25 DIAGNOSIS — Z51.81 THERAPEUTIC DRUG MONITORING: Primary | ICD-10-CM

## 2021-10-25 DIAGNOSIS — E66.09 CLASS 1 OBESITY DUE TO EXCESS CALORIES WITH SERIOUS COMORBIDITY IN ADULT, UNSPECIFIED BMI: ICD-10-CM

## 2021-10-25 DIAGNOSIS — I10 ESSENTIAL HYPERTENSION: ICD-10-CM

## 2021-10-25 PROCEDURE — 99213 OFFICE O/P EST LOW 20 MIN: CPT | Performed by: PHYSICIAN ASSISTANT

## 2021-10-25 RX ORDER — NALTREXONE HYDROCHLORIDE AND BUPROPION HYDROCHLORIDE 8; 90 MG/1; MG/1
TABLET, EXTENDED RELEASE ORAL
Qty: 120 TABLET | Refills: 2 | OUTPATIENT
Start: 2021-10-25

## 2021-10-25 RX ORDER — NALTREXONE HYDROCHLORIDE AND BUPROPION HYDROCHLORIDE 8; 90 MG/1; MG/1
2 TABLET, EXTENDED RELEASE ORAL 2 TIMES DAILY
Qty: 120 TABLET | Refills: 1 | Status: SHIPPED | OUTPATIENT
Start: 2021-10-25 | End: 2021-11-24

## 2021-10-25 NOTE — TELEPHONE ENCOUNTER
Requesting   Requested Prescriptions     Pending Prescriptions Disp Refills   • Naltrexone-buPROPion HCl ER (CONTRAVE) 8-90 MG Oral Tablet 12 Hr 120 tablet 2     Sig: TAKE 2 TABLETS BY MOUTH TWICE DAILY     LOV: 7/25/21  RTC: 8 weeks   Filled: 10/5/21 #120

## 2021-10-25 NOTE — PROGRESS NOTES
This visit is conducted using Telemedicine with live, interactive video and audio. Patient has been referred to the [facility] website at [email or web address] to review the yearly Consent to Treat document.     Patient understands and accepts financial res HGB 13.3 11/18/2020    HCT 40.0 11/18/2020    MCV 89.7 11/18/2020    MCH 29.8 11/18/2020    MCHC 33.3 11/18/2020    RDW 12.2 11/18/2020     11/18/2020     Lab Results   Component Value Date    GLU 85 05/21/2021    BUN 13 05/21/2021    BUNCREA 14. and PM., Disp: 7 tablet, Rfl: 0  Galcanezumab-gnlm (EMGALITY) 120 MG/ML Subcutaneous Solution Auto-injector, Inject 120 mg into the skin every 30 (thirty) days. , Disp: 3 pen, Rfl: 3  Ascorbic Acid (VITAMIN C OR), Take by mouth., Disp: , Rfl:   Acidophilus/ obesity due to excess calories with serious comorbidity in adult, unspecified BMI    Essential hypertension    Other orders  -     Naltrexone-buPROPion HCl ER (CONTRAVE) 8-90 MG Oral Tablet 12 Hr; Take 2 tablets by mouth 2 (two) times daily.         PLAN  I allow for sufficient and adequate time. This billing was spent on reviewing labs, medications, radiology tests and decision making.   Appropriate medical decision-making and tests are ordered as detailed in the plan of care above    Arnaldo Pyle PA-C

## 2021-11-11 ENCOUNTER — TELEPHONE (OUTPATIENT)
Dept: INTERNAL MEDICINE CLINIC | Facility: CLINIC | Age: 56
End: 2021-11-11

## 2021-11-11 NOTE — TELEPHONE ENCOUNTER
PA needed for [facility] 9138 with Lorraine Hickey [phone]    Started med 3/25/21 at 177#  Last weight 10/25/21 at 156#    Diagnosis E66.09  Also HTN I10,    On 3/25/21 I attempted to get PA for med and got notice that she has no cove

## 2021-11-22 RX ORDER — NALTREXONE HYDROCHLORIDE 50 MG/1
50 TABLET, FILM COATED ORAL DAILY
Qty: 90 TABLET | Refills: 0 | Status: SHIPPED | OUTPATIENT
Start: 2021-11-22

## 2021-11-22 RX ORDER — BUPROPION HYDROCHLORIDE 300 MG/1
300 TABLET ORAL DAILY
Qty: 90 TABLET | Refills: 0 | Status: SHIPPED | OUTPATIENT
Start: 2021-11-22

## 2021-11-22 NOTE — H&P
Clinic Note [facility]     Assessment/Plan:  64year old female    1. Left wrist pain given its improvement and intermittent nature we discussed various nonsurgical treatment options.   Would recommend continued bracing and can take diclofenac medica Quality 226: Preventive Care And Screening: Tobacco Use: Screening And Cessation Intervention: Tobacco Screening not Performed for Medical Reasons Detail Level: Detailed • Latent tuberculosis 11/2016   • Menorrhagia    • Multinodular goiter (nontoxic)    • Ocular migraine    • Syncope 3/1/2020     Past Surgical History:   Procedure Laterality Date   • BIOPSY OF THYROID,PATRIA  6/19/2012   • BIOPSY OF [facility]  4/20 Injection Solution Inject 0.3 mg into the skin. • AZELASTINE HCL 0.1 % Nasal Solution USE 2 SPRAYS INTRANASALLY TWICE A DAY 1 Bottle 11   • SYNTHROID 112 MCG Oral Tab Synthroid brand 112 mcg - 6 days per week but just HALF tablet on Sundays.  90 tablet Quality 130: Documentation Of Current Medications In The Medical Record: Current Medications Documented Quality 431: Preventive Care And Screening: Unhealthy Alcohol Use - Screening: Patient screened for unhealthy alcohol use using a single question and scores less than 2 times per year • Asthma Daughter    • PTSD Daughter    • Other (assualted) Daughter         assaulted   • Cancer Maternal Grandmother         uterine   • Substance Abuse Maternal Grandmother    • Neurological Disorder Maternal Grandmother         Dementia   • Alcohol a

## 2021-11-22 NOTE — TELEPHONE ENCOUNTER
Let's change to wellbutrin 300mg ER and naltrexone 50mg  I went ahead and sent to pharmacy  She can take both in the morning

## 2022-02-28 ENCOUNTER — TELEPHONE (OUTPATIENT)
Dept: INTERNAL MEDICINE CLINIC | Facility: CLINIC | Age: 57
End: 2022-02-28

## 2022-02-28 RX ORDER — NALTREXONE HYDROCHLORIDE 50 MG/1
TABLET, FILM COATED ORAL
Qty: 90 TABLET | Refills: 0 | OUTPATIENT
Start: 2022-02-28

## 2022-02-28 RX ORDER — BUPROPION HYDROCHLORIDE 300 MG/1
TABLET ORAL
Qty: 90 TABLET | Refills: 0 | OUTPATIENT
Start: 2022-02-28

## 2022-02-28 NOTE — TELEPHONE ENCOUNTER
Pt will be out of meds   buPROPion 300 MG Oral Tablet 24 Hr  naltrexone 50 MG Oral Tab  Prior to her appt in April

## 2022-03-01 RX ORDER — NALTREXONE HYDROCHLORIDE 50 MG/1
50 TABLET, FILM COATED ORAL DAILY
Qty: 90 TABLET | Refills: 0 | Status: SHIPPED | OUTPATIENT
Start: 2022-03-01

## 2022-03-01 RX ORDER — BUPROPION HYDROCHLORIDE 300 MG/1
300 TABLET ORAL DAILY
Qty: 90 TABLET | Refills: 0 | Status: SHIPPED | OUTPATIENT
Start: 2022-03-01

## 2022-04-15 ENCOUNTER — OFFICE VISIT (OUTPATIENT)
Dept: INTERNAL MEDICINE CLINIC | Facility: CLINIC | Age: 57
End: 2022-04-15
Payer: COMMERCIAL

## 2022-04-15 VITALS
SYSTOLIC BLOOD PRESSURE: 118 MMHG | BODY MASS INDEX: 25.87 KG/M2 | OXYGEN SATURATION: 97 % | HEART RATE: 67 BPM | RESPIRATION RATE: 15 BRPM | WEIGHT: 146 LBS | DIASTOLIC BLOOD PRESSURE: 82 MMHG | HEIGHT: 63 IN

## 2022-04-15 DIAGNOSIS — Z51.81 THERAPEUTIC DRUG MONITORING: Primary | ICD-10-CM

## 2022-04-15 DIAGNOSIS — E66.3 OVERWEIGHT (BMI 25.0-29.9): ICD-10-CM

## 2022-04-15 DIAGNOSIS — I10 ESSENTIAL HYPERTENSION: ICD-10-CM

## 2022-04-15 PROCEDURE — 3079F DIAST BP 80-89 MM HG: CPT | Performed by: PHYSICIAN ASSISTANT

## 2022-04-15 PROCEDURE — 99213 OFFICE O/P EST LOW 20 MIN: CPT | Performed by: PHYSICIAN ASSISTANT

## 2022-04-15 PROCEDURE — 3008F BODY MASS INDEX DOCD: CPT | Performed by: PHYSICIAN ASSISTANT

## 2022-04-15 PROCEDURE — 3074F SYST BP LT 130 MM HG: CPT | Performed by: PHYSICIAN ASSISTANT

## 2022-06-06 NOTE — TELEPHONE ENCOUNTER
Requesting bupropion 300 mg  LOV: 4/15/22  RTC: not noted  Last Relevant Labs: na  Filled: 3/1/22 #90 with 0 refills    Future Appointments   Date Time Provider Natalio White   7/15/2022  9:20 AM LISA Gallagher [facility] [address]

## 2022-06-08 RX ORDER — BUPROPION HYDROCHLORIDE 300 MG/1
TABLET ORAL
Qty: 90 TABLET | Refills: 0 | Status: SHIPPED | OUTPATIENT
Start: 2022-06-08

## 2022-06-20 NOTE — TELEPHONE ENCOUNTER
Requesting Naltrexone 50 mg  LOV: 4/15/22  RTC: not noted  Last Relevant Labs: na  Filled: 3/1/22 #90 with 0 refills    Future Appointments   Date Time Provider Natalio White   7/15/2022  9:20 AM LISA Patino [facility] [address]     Last saw [place]

## 2022-06-21 RX ORDER — NALTREXONE HYDROCHLORIDE 50 MG/1
50 TABLET, FILM COATED ORAL DAILY
Qty: 90 TABLET | Refills: 0 | Status: SHIPPED | OUTPATIENT
Start: 2022-06-21

## 2022-06-28 ENCOUNTER — TELEPHONE (OUTPATIENT)
Dept: INTERNAL MEDICINE CLINIC | Facility: CLINIC | Age: 57
End: 2022-06-28

## 2022-07-11 ENCOUNTER — OFFICE VISIT (OUTPATIENT)
Dept: INTERNAL MEDICINE CLINIC | Facility: CLINIC | Age: 57
End: 2022-07-11
Payer: COMMERCIAL

## 2022-07-11 VITALS
HEIGHT: 63 IN | SYSTOLIC BLOOD PRESSURE: 102 MMHG | RESPIRATION RATE: 16 BRPM | WEIGHT: 144 LBS | DIASTOLIC BLOOD PRESSURE: 62 MMHG | BODY MASS INDEX: 25.52 KG/M2 | HEART RATE: 65 BPM

## 2022-07-11 DIAGNOSIS — Z51.81 THERAPEUTIC DRUG MONITORING: Primary | ICD-10-CM

## 2022-07-11 DIAGNOSIS — E66.3 OVERWEIGHT (BMI 25.0-29.9): ICD-10-CM

## 2022-07-11 PROCEDURE — 3008F BODY MASS INDEX DOCD: CPT | Performed by: PHYSICIAN ASSISTANT

## 2022-07-11 PROCEDURE — 3074F SYST BP LT 130 MM HG: CPT | Performed by: PHYSICIAN ASSISTANT

## 2022-07-11 PROCEDURE — 3078F DIAST BP <80 MM HG: CPT | Performed by: PHYSICIAN ASSISTANT

## 2022-07-11 PROCEDURE — 99213 OFFICE O/P EST LOW 20 MIN: CPT | Performed by: PHYSICIAN ASSISTANT

## 2022-07-11 RX ORDER — PREDNISONE 10 MG/1
TABLET ORAL
COMMUNITY
Start: 2022-05-17 | End: 2022-07-11

## 2022-07-11 RX ORDER — RIMEGEPANT SULFATE 75 MG/75MG
1 TABLET, ORALLY DISINTEGRATING ORAL AS NEEDED
COMMUNITY
Start: 2022-06-02

## 2022-09-05 NOTE — TELEPHONE ENCOUNTER
Requesting Bupropion  mg  LOV: 7/11/22  RTC: not noted  Last Relevant Labs: na  Filled: 6/8/22 #90 with 0 refills    Future Appointments   Date Time Provider Natalio White   10/26/2022 12:20 PM Alvin Colmenraes [facility] [address]   1/12/2023 11:40 AM Macy Joy MD [facility] [address]

## 2022-09-06 RX ORDER — BUPROPION HYDROCHLORIDE 300 MG/1
TABLET ORAL
Qty: 90 TABLET | Refills: 0 | Status: SHIPPED | OUTPATIENT
Start: 2022-09-06

## 2022-09-24 RX ORDER — BUPROPION HYDROCHLORIDE 300 MG/1
300 TABLET ORAL DAILY
Qty: 90 TABLET | Refills: 0 | Status: CANCELLED | OUTPATIENT
Start: 2022-09-24

## 2022-09-26 NOTE — TELEPHONE ENCOUNTER
Requesting bupropion 300 mg  LOV: 7/11/22  RTC: not noted  Last Relevant Labs: na  Filled: 9/6/22 #90 with 0 refills    Future Appointments   Date Time Provider Natalio White   10/26/2022 12:20 PM [facility] [address]   1/12/2023 11:40 AM Shannon Serrano MD [facility] [address]

## 2022-09-27 RX ORDER — NALTREXONE HYDROCHLORIDE 50 MG/1
50 TABLET, FILM COATED ORAL DAILY
Qty: 90 TABLET | Refills: 0 | Status: SHIPPED | OUTPATIENT
Start: 2022-09-27 | End: 2023-01-02

## 2022-09-27 NOTE — TELEPHONE ENCOUNTER
I reached out to Azalia Cunningham to let her know patient has been out of med and she did approve a refill. Now sent.

## 2022-10-26 ENCOUNTER — TELEMEDICINE (OUTPATIENT)
Dept: INTERNAL MEDICINE CLINIC | Facility: CLINIC | Age: 57
End: 2022-10-26

## 2022-10-26 DIAGNOSIS — E66.3 OVERWEIGHT (BMI 25.0-29.9): ICD-10-CM

## 2022-10-26 DIAGNOSIS — Z51.81 THERAPEUTIC DRUG MONITORING: Primary | ICD-10-CM

## 2022-10-26 PROCEDURE — 99213 OFFICE O/P EST LOW 20 MIN: CPT | Performed by: PHYSICIAN ASSISTANT

## 2022-12-12 RX ORDER — BUPROPION HYDROCHLORIDE 300 MG/1
TABLET ORAL
Qty: 90 TABLET | Refills: 0 | Status: SHIPPED | OUTPATIENT
Start: 2022-12-12

## 2023-01-03 RX ORDER — NALTREXONE HYDROCHLORIDE 50 MG/1
50 TABLET, FILM COATED ORAL DAILY
Qty: 90 TABLET | Refills: 0 | Status: SHIPPED | OUTPATIENT
Start: 2023-01-03

## 2023-01-25 ENCOUNTER — TELEPHONE (OUTPATIENT)
Dept: ORTHOPEDICS CLINIC | Facility: CLINIC | Age: 58
End: 2023-01-25

## 2023-01-25 DIAGNOSIS — Z01.89 ENCOUNTER FOR LOWER EXTREMITY COMPARISON IMAGING STUDY: Primary | ICD-10-CM

## 2023-01-25 DIAGNOSIS — M25.561 RIGHT KNEE PAIN, UNSPECIFIED CHRONICITY: ICD-10-CM

## 2023-01-25 NOTE — TELEPHONE ENCOUNTER
Est Pt Right Knee Pain. Please advise if imaging is needed.   Future Appointments   Date Time Provider Natalio Cindy   1/26/2023 12:20 PM Bronson Olivarez MD [facility] [address]   2/3/2023  7:30 AM Jaci Livingston MD [id number]   2/27/2023  5:00 PM Damian Verma [facility] [address]

## 2023-02-03 ENCOUNTER — OFFICE VISIT (OUTPATIENT)
Dept: ORTHOPEDICS CLINIC | Facility: CLINIC | Age: 58
End: 2023-02-03
Payer: COMMERCIAL

## 2023-02-03 ENCOUNTER — HOSPITAL ENCOUNTER (OUTPATIENT)
Dept: GENERAL RADIOLOGY | Age: 58
Discharge: HOME | End: 2023-02-03
Attending: ORTHOPAEDIC SURGERY
Payer: COMMERCIAL

## 2023-02-03 VITALS — HEIGHT: 63 IN | WEIGHT: 144 LBS | BODY MASS INDEX: 25.52 KG/M2

## 2023-02-03 DIAGNOSIS — M22.2X1 PATELLOFEMORAL ARTHRALGIA OF BOTH KNEES: Primary | ICD-10-CM

## 2023-02-03 DIAGNOSIS — M22.2X2 PATELLOFEMORAL ARTHRALGIA OF BOTH KNEES: Primary | ICD-10-CM

## 2023-02-03 DIAGNOSIS — M25.561 RIGHT KNEE PAIN, UNSPECIFIED CHRONICITY: ICD-10-CM

## 2023-02-03 DIAGNOSIS — Z01.89 ENCOUNTER FOR LOWER EXTREMITY COMPARISON IMAGING STUDY: ICD-10-CM

## 2023-02-03 PROCEDURE — 73562 X-RAY EXAM OF KNEE 3: CPT | Performed by: ORTHOPAEDIC SURGERY

## 2023-02-03 PROCEDURE — 73564 X-RAY EXAM KNEE 4 OR MORE: CPT | Performed by: ORTHOPAEDIC SURGERY

## 2023-02-13 ENCOUNTER — TELEPHONE (OUTPATIENT)
Dept: PHYSICAL THERAPY | Facility: HOSPITAL | Age: 58
End: 2023-02-13

## 2023-02-14 ENCOUNTER — OFFICE VISIT (OUTPATIENT)
Dept: PHYSICAL THERAPY | Age: 58
End: 2023-02-14
Attending: ORTHOPAEDIC SURGERY
Payer: COMMERCIAL

## 2023-02-14 DIAGNOSIS — M22.2X2 PATELLOFEMORAL ARTHRALGIA OF BOTH KNEES: ICD-10-CM

## 2023-02-14 DIAGNOSIS — M22.2X1 PATELLOFEMORAL ARTHRALGIA OF BOTH KNEES: ICD-10-CM

## 2023-02-14 PROCEDURE — 97161 PT EVAL LOW COMPLEX 20 MIN: CPT | Performed by: PHYSICAL THERAPIST

## 2023-02-14 PROCEDURE — 97110 THERAPEUTIC EXERCISES: CPT | Performed by: PHYSICAL THERAPIST

## 2023-02-16 ENCOUNTER — TELEPHONE (OUTPATIENT)
Dept: PHYSICAL THERAPY | Facility: HOSPITAL | Age: 58
End: 2023-02-16

## 2023-02-16 ENCOUNTER — OFFICE VISIT (OUTPATIENT)
Dept: PHYSICAL THERAPY | Age: 58
End: 2023-02-16
Attending: ORTHOPAEDIC SURGERY
Payer: COMMERCIAL

## 2023-02-16 PROCEDURE — 97110 THERAPEUTIC EXERCISES: CPT | Performed by: PHYSICAL THERAPIST

## 2023-02-22 ENCOUNTER — OFFICE VISIT (OUTPATIENT)
Dept: PHYSICAL THERAPY | Age: 58
End: 2023-02-22
Attending: ORTHOPAEDIC SURGERY
Payer: COMMERCIAL

## 2023-02-22 PROCEDURE — 97110 THERAPEUTIC EXERCISES: CPT

## 2023-02-27 ENCOUNTER — OFFICE VISIT (OUTPATIENT)
Dept: INTERNAL MEDICINE CLINIC | Facility: CLINIC | Age: 58
End: 2023-02-27
Payer: COMMERCIAL

## 2023-02-27 VITALS
HEART RATE: 76 BPM | DIASTOLIC BLOOD PRESSURE: 80 MMHG | SYSTOLIC BLOOD PRESSURE: 122 MMHG | HEIGHT: 63 IN | WEIGHT: 145 LBS | RESPIRATION RATE: 16 BRPM | BODY MASS INDEX: 25.69 KG/M2

## 2023-02-27 DIAGNOSIS — I10 ESSENTIAL HYPERTENSION: ICD-10-CM

## 2023-02-27 DIAGNOSIS — Z51.81 THERAPEUTIC DRUG MONITORING: Primary | ICD-10-CM

## 2023-02-27 DIAGNOSIS — E66.3 OVERWEIGHT (BMI 25.0-29.9): ICD-10-CM

## 2023-02-27 PROCEDURE — 3079F DIAST BP 80-89 MM HG: CPT | Performed by: PHYSICIAN ASSISTANT

## 2023-02-27 PROCEDURE — 99213 OFFICE O/P EST LOW 20 MIN: CPT | Performed by: PHYSICIAN ASSISTANT

## 2023-02-27 PROCEDURE — 3074F SYST BP LT 130 MM HG: CPT | Performed by: PHYSICIAN ASSISTANT

## 2023-02-27 PROCEDURE — 3008F BODY MASS INDEX DOCD: CPT | Performed by: PHYSICIAN ASSISTANT

## 2023-02-27 RX ORDER — BUPROPION HYDROCHLORIDE 300 MG/1
300 TABLET ORAL DAILY
Qty: 90 TABLET | Refills: 0 | Status: SHIPPED | OUTPATIENT
Start: 2023-02-27

## 2023-02-27 RX ORDER — NALTREXONE HYDROCHLORIDE 50 MG/1
50 TABLET, FILM COATED ORAL DAILY
Qty: 90 TABLET | Refills: 0 | Status: SHIPPED | OUTPATIENT
Start: 2023-02-27

## 2023-02-28 ENCOUNTER — OFFICE VISIT (OUTPATIENT)
Dept: PHYSICAL THERAPY | Age: 58
End: 2023-02-28
Attending: ORTHOPAEDIC SURGERY
Payer: COMMERCIAL

## 2023-02-28 PROCEDURE — 97110 THERAPEUTIC EXERCISES: CPT | Performed by: PHYSICAL THERAPIST

## 2023-03-02 ENCOUNTER — OFFICE VISIT (OUTPATIENT)
Dept: PHYSICAL THERAPY | Age: 58
End: 2023-03-02
Attending: ORTHOPAEDIC SURGERY
Payer: COMMERCIAL

## 2023-03-02 PROCEDURE — 97110 THERAPEUTIC EXERCISES: CPT | Performed by: PHYSICAL THERAPIST

## 2023-03-07 ENCOUNTER — OFFICE VISIT (OUTPATIENT)
Dept: PHYSICAL THERAPY | Age: 58
End: 2023-03-07
Attending: ORTHOPAEDIC SURGERY
Payer: COMMERCIAL

## 2023-03-07 PROCEDURE — 97110 THERAPEUTIC EXERCISES: CPT | Performed by: PHYSICAL THERAPIST

## 2023-03-14 ENCOUNTER — PATIENT MESSAGE (OUTPATIENT)
Dept: ORTHOPEDICS CLINIC | Facility: CLINIC | Age: 58
End: 2023-03-14

## 2023-04-07 ENCOUNTER — TELEPHONE (OUTPATIENT)
Dept: ORTHOPEDICS CLINIC | Facility: CLINIC | Age: 58
End: 2023-04-07

## 2023-04-07 DIAGNOSIS — R52 PAIN: Primary | ICD-10-CM

## 2023-04-07 NOTE — TELEPHONE ENCOUNTER
Est. 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 Please advise if imaging is needed.   Future Appointments   Date Time Provider Natalio White   4/25/2023 11:30 AM Rosedno Gruber MD [facility] [place]   6/9/2023  9:00 AM Loyd Locke PA-C [facility] [address]

## 2023-04-25 ENCOUNTER — OFFICE VISIT (OUTPATIENT)
Dept: ORTHOPEDICS CLINIC | Facility: CLINIC | Age: 58
End: 2023-04-25
Payer: COMMERCIAL

## 2023-04-25 ENCOUNTER — HOSPITAL ENCOUNTER (OUTPATIENT)
Dept: GENERAL RADIOLOGY | Age: 58
Discharge: HOME | End: 2023-04-25
Attending: ORTHOPAEDIC SURGERY
Payer: COMMERCIAL

## 2023-04-25 VITALS — WEIGHT: 145 LBS | BODY MASS INDEX: 26.68 KG/M2 | HEIGHT: 62 IN

## 2023-04-25 DIAGNOSIS — R52 PAIN: ICD-10-CM

## 2023-04-25 DIAGNOSIS — M67.449 MUCOUS CYST OF FINGER: Primary | ICD-10-CM

## 2023-04-25 PROCEDURE — 3008F BODY MASS INDEX DOCD: CPT | Performed by: ORTHOPAEDIC SURGERY

## 2023-04-25 PROCEDURE — 99214 OFFICE O/P EST MOD 30 MIN: CPT | Performed by: ORTHOPAEDIC SURGERY

## 2023-04-25 PROCEDURE — 73140 X-RAY EXAM OF FINGER(S): CPT | Performed by: ORTHOPAEDIC SURGERY

## 2023-04-25 NOTE — PROGRESS NOTES
SURGICAL BOOKING SHEET   Name: Pratibha Myers  MRN: [id number]   : 1965    Surgical Date:   2023   Surgical Consent:   Left index finger mucous cyst and bone spur excision   Diagnosis:    (M67.449) Mucous cyst of finger  (primary encounter diagnosis)    Procedure Codes:   Excision of DIP joint osteophyte (97109) or tendon sheath cyst (98490)   Disposition:   Outpatient   Operative Time:   15 mins   Antibiotics:   Ancef 2g   Anesthesia Type:   Monitored Anesthesia Care (MAC)   Clearance:    NONE   Equipment:   Mucous Cyst: Brewster Blade, Mini-C-arm, Local Off Field (0.5% marcaine + 1% lidocaine w/o Epi 1:1 mix), Size 6 Glove, 4-0 nylon, Bacitracin, Adaptic, 1 inch Coban   Positioning:   Supine with arm table on transport cart   Assistant:   Assistant: John Amos PA-C   Follow Up:   10-12 days with Angelika Ramirez   Pain Medication:   None   Therapy:   None

## 2023-04-26 ENCOUNTER — TELEPHONE (OUTPATIENT)
Dept: ORTHOPEDICS CLINIC | Facility: CLINIC | Age: 58
End: 2023-04-26

## 2023-04-26 DIAGNOSIS — M67.449 MUCOUS CYST OF FINGER: Primary | ICD-10-CM

## 2023-04-28 ENCOUNTER — TELEPHONE (OUTPATIENT)
Dept: ORTHOPEDICS CLINIC | Facility: CLINIC | Age: 58
End: 2023-04-28

## 2023-04-28 NOTE — TELEPHONE ENCOUNTER
Patient was seen this week for left hand cyst. Scheduled surgery for July. Last night the cyst ruptured, patient is concerned with joint infection. No fever or chills. Patient covered area with a Band-Aid. Using antibiotic Band-Aid cleaning with bacitracin. Wrapped with a gauze. Drainage is scant. Has some gel like drainage on Band-Aid. Denies redness, swelling or pain. Would like further advise on what to do? Should surgery be moved up?     Please advise

## 2023-04-30 NOTE — TELEPHONE ENCOUNTER
Spoke to patient. Will monitor mucous cyst. Patient to send me photo on Wednesday of its appearance. Patient to monitor for infection.

## 2023-05-05 RX ORDER — AMOXICILLIN 875 MG/1
875 TABLET, COATED ORAL 2 TIMES DAILY
COMMUNITY
End: 2023-05-23

## 2023-05-10 ENCOUNTER — TELEPHONE (OUTPATIENT)
Dept: ORTHOPEDICS CLINIC | Facility: CLINIC | Age: 58
End: 2023-05-10

## 2023-05-10 NOTE — TELEPHONE ENCOUNTER
INFORMED PATIENT MESSAGE CAME FROM PRE ADMISSION, SHE WOULD NEED TO CALL AND TOUCH BASE WITH THEM. PRE ADMISSION'S DIRECT NUMBER GIVEN TO PATIENT.

## 2023-05-11 ENCOUNTER — ANESTHESIA EVENT (OUTPATIENT)
Dept: SURGERY | Facility: HOSPITAL | Age: 58
End: 2023-05-11
Payer: COMMERCIAL

## 2023-05-12 ENCOUNTER — HOSPITAL ENCOUNTER (OUTPATIENT)
Facility: HOSPITAL | Age: 58
Setting detail: HOSPITAL OUTPATIENT SURGERY
Discharge: HOME | End: 2023-05-12
Attending: ORTHOPAEDIC SURGERY | Admitting: ORTHOPAEDIC SURGERY
Payer: COMMERCIAL

## 2023-05-12 ENCOUNTER — ANESTHESIA (OUTPATIENT)
Dept: SURGERY | Facility: HOSPITAL | Age: 58
End: 2023-05-12
Payer: COMMERCIAL

## 2023-05-12 VITALS
HEART RATE: 67 BPM | TEMPERATURE: 98 F | OXYGEN SATURATION: 100 % | HEIGHT: 62 IN | RESPIRATION RATE: 16 BRPM | WEIGHT: 145.81 LBS | SYSTOLIC BLOOD PRESSURE: 119 MMHG | DIASTOLIC BLOOD PRESSURE: 75 MMHG | BODY MASS INDEX: 26.83 KG/M2

## 2023-05-12 DIAGNOSIS — M67.449 MUCOUS CYST OF FINGER: ICD-10-CM

## 2023-05-12 PROCEDURE — 0RBX0ZZ EXCISION OF LEFT FINGER PHALANGEAL JOINT, OPEN APPROACH: ICD-10-PCS | Performed by: ORTHOPAEDIC SURGERY

## 2023-05-12 PROCEDURE — 88304 TISSUE EXAM BY PATHOLOGIST: CPT | Performed by: ORTHOPAEDIC SURGERY

## 2023-05-12 PROCEDURE — 0PBV0ZZ EXCISION OF LEFT FINGER PHALANX, OPEN APPROACH: ICD-10-PCS | Performed by: ORTHOPAEDIC SURGERY

## 2023-05-12 RX ORDER — SODIUM CHLORIDE, SODIUM LACTATE, POTASSIUM CHLORIDE, CALCIUM CHLORIDE 600; 310; 30; 20 MG/100ML; MG/100ML; MG/100ML; MG/100ML
INJECTION, SOLUTION INTRAVENOUS CONTINUOUS
Status: DISCONTINUED | OUTPATIENT
Start: 2023-05-12 | End: 2023-05-12

## 2023-05-12 RX ORDER — HYDROMORPHONE HYDROCHLORIDE 1 MG/ML
0.6 INJECTION, SOLUTION INTRAMUSCULAR; INTRAVENOUS; SUBCUTANEOUS EVERY 5 MIN PRN
Status: DISCONTINUED | OUTPATIENT
Start: 2023-05-12 | End: 2023-05-12

## 2023-05-12 RX ORDER — HYDROMORPHONE HYDROCHLORIDE 1 MG/ML
0.4 INJECTION, SOLUTION INTRAMUSCULAR; INTRAVENOUS; SUBCUTANEOUS EVERY 5 MIN PRN
Status: DISCONTINUED | OUTPATIENT
Start: 2023-05-12 | End: 2023-05-12

## 2023-05-12 RX ORDER — HYDROCODONE BITARTRATE AND ACETAMINOPHEN 5; 325 MG/1; MG/1
1 TABLET ORAL ONCE AS NEEDED
Status: DISCONTINUED | OUTPATIENT
Start: 2023-05-12 | End: 2023-05-12

## 2023-05-12 RX ORDER — CEFAZOLIN SODIUM/WATER 2 G/20 ML
2 SYRINGE (ML) INTRAVENOUS ONCE
Status: COMPLETED | OUTPATIENT
Start: 2023-05-12 | End: 2023-05-12

## 2023-05-12 RX ORDER — ACETAMINOPHEN 500 MG
1000 TABLET ORAL ONCE AS NEEDED
Status: DISCONTINUED | OUTPATIENT
Start: 2023-05-12 | End: 2023-05-12

## 2023-05-12 RX ORDER — LIDOCAINE HYDROCHLORIDE 10 MG/ML
INJECTION, SOLUTION INFILTRATION; PERINEURAL AS NEEDED
Status: DISCONTINUED | OUTPATIENT
Start: 2023-05-12 | End: 2023-05-12

## 2023-05-12 RX ORDER — KETOROLAC TROMETHAMINE 30 MG/ML
INJECTION, SOLUTION INTRAMUSCULAR; INTRAVENOUS AS NEEDED
Status: DISCONTINUED | OUTPATIENT
Start: 2023-05-12 | End: 2023-05-12 | Stop reason: SURG

## 2023-05-12 RX ORDER — TRAMADOL HYDROCHLORIDE 50 MG/1
50 TABLET ORAL EVERY 6 HOURS PRN
Qty: 10 TABLET | Refills: 1 | Status: SHIPPED | OUTPATIENT
Start: 2023-05-12 | End: 2023-05-23

## 2023-05-12 RX ORDER — IBUPROFEN 200 MG
600 TABLET ORAL ONCE AS NEEDED
Status: DISCONTINUED | OUTPATIENT
Start: 2023-05-12 | End: 2023-05-12

## 2023-05-12 RX ORDER — BUPIVACAINE HYDROCHLORIDE 5 MG/ML
INJECTION, SOLUTION EPIDURAL; INTRACAUDAL AS NEEDED
Status: DISCONTINUED | OUTPATIENT
Start: 2023-05-12 | End: 2023-05-12

## 2023-05-12 RX ORDER — MIDAZOLAM HYDROCHLORIDE 1 MG/ML
INJECTION INTRAMUSCULAR; INTRAVENOUS AS NEEDED
Status: DISCONTINUED | OUTPATIENT
Start: 2023-05-12 | End: 2023-05-12 | Stop reason: SURG

## 2023-05-12 RX ORDER — NALOXONE HYDROCHLORIDE 0.4 MG/ML
80 INJECTION, SOLUTION INTRAMUSCULAR; INTRAVENOUS; SUBCUTANEOUS AS NEEDED
Status: DISCONTINUED | OUTPATIENT
Start: 2023-05-12 | End: 2023-05-12

## 2023-05-12 RX ORDER — ACETAMINOPHEN 500 MG
1000 TABLET ORAL ONCE
Status: DISCONTINUED | OUTPATIENT
Start: 2023-05-12 | End: 2023-05-12

## 2023-05-12 RX ORDER — HYDROMORPHONE HYDROCHLORIDE 1 MG/ML
0.2 INJECTION, SOLUTION INTRAMUSCULAR; INTRAVENOUS; SUBCUTANEOUS EVERY 5 MIN PRN
Status: DISCONTINUED | OUTPATIENT
Start: 2023-05-12 | End: 2023-05-12

## 2023-05-12 RX ORDER — HYDROCODONE BITARTRATE AND ACETAMINOPHEN 5; 325 MG/1; MG/1
2 TABLET ORAL ONCE AS NEEDED
Status: DISCONTINUED | OUTPATIENT
Start: 2023-05-12 | End: 2023-05-12

## 2023-05-12 RX ORDER — ONDANSETRON 2 MG/ML
4 INJECTION INTRAMUSCULAR; INTRAVENOUS EVERY 6 HOURS PRN
Status: DISCONTINUED | OUTPATIENT
Start: 2023-05-12 | End: 2023-05-12

## 2023-05-12 RX ORDER — SCOLOPAMINE TRANSDERMAL SYSTEM 1 MG/1
1 PATCH, EXTENDED RELEASE TRANSDERMAL ONCE
Status: DISCONTINUED | OUTPATIENT
Start: 2023-05-12 | End: 2023-05-12

## 2023-05-12 RX ORDER — PREDNISONE 10 MG/1
TABLET ORAL
COMMUNITY
Start: 2023-05-04 | End: 2023-05-23

## 2023-05-12 RX ORDER — LIDOCAINE HYDROCHLORIDE 10 MG/ML
INJECTION, SOLUTION EPIDURAL; INFILTRATION; INTRACAUDAL; PERINEURAL AS NEEDED
Status: DISCONTINUED | OUTPATIENT
Start: 2023-05-12 | End: 2023-05-12 | Stop reason: SURG

## 2023-05-12 RX ORDER — PROCHLORPERAZINE EDISYLATE 5 MG/ML
5 INJECTION INTRAMUSCULAR; INTRAVENOUS EVERY 8 HOURS PRN
Status: DISCONTINUED | OUTPATIENT
Start: 2023-05-12 | End: 2023-05-12

## 2023-05-12 RX ADMIN — MIDAZOLAM HYDROCHLORIDE 2 MG: 1 INJECTION INTRAMUSCULAR; INTRAVENOUS at 16:04:00

## 2023-05-12 RX ADMIN — KETOROLAC TROMETHAMINE 30 MG: 30 INJECTION, SOLUTION INTRAMUSCULAR; INTRAVENOUS at 16:29:00

## 2023-05-12 RX ADMIN — CEFAZOLIN SODIUM/WATER 2 G: 2 G/20 ML SYRINGE (ML) INTRAVENOUS at 16:02:00

## 2023-05-12 RX ADMIN — SODIUM CHLORIDE, SODIUM LACTATE, POTASSIUM CHLORIDE, CALCIUM CHLORIDE: 600; 310; 30; 20 INJECTION, SOLUTION INTRAVENOUS at 16:36:00

## 2023-05-12 RX ADMIN — LIDOCAINE HYDROCHLORIDE 50 MG: 10 INJECTION, SOLUTION EPIDURAL; INFILTRATION; INTRACAUDAL; PERINEURAL at 16:07:00

## 2023-05-12 NOTE — DISCHARGE INSTRUCTIONS
Dressing: Keep the dressing on until postop day 5. Keep dressing covered and dry while showering. No baths or submerging incision in water. Keep incision covered with band aid when outside, ok to keep open when at home, until follow up. Weight Bearing: No lifting greater than 1 pounds. Activity: Resume your normal activities of daily living within the 1 pound lifting restriction. Pain: Ice and elevate extremity to minimize swelling. Take acetaminophen and ibuprofen for pain. Follow-up: Call for follow-up appointment if you do not have one.      You received a drug called Toradol which is an Anti Inflammatory at: 4:30 pm     Do not take any Anti Inflammatory like Motrin, Advil, Aleve or Ibuprofen until after: 10:30 pm   Please report any suspected allergic reactions or bleeding issues to your doctor

## 2023-05-12 NOTE — ANESTHESIA POSTPROCEDURE EVALUATION
[address] [facility] Patient Status:  Hospital Outpatient Surgery   Age/Gender 62year old female MRN [id number]   [facility] SURGERY Attending Wu Brock MD   Hosp Day # 0 PCP Deandre Patiño MD       Anesthesia Post-op Note    LEFT INDEX FINGER MUCOUS CYST AND BONE SPUR EXCISION. Procedure Summary     Date: 05/12/23 Room / Location: [facility]4 [facility] OR 06 / 1404 [facility] OR    Anesthesia Start: 7264 Anesthesia Stop: 7486    Procedure: LEFT INDEX FINGER MUCOUS CYST AND BONE SPUR EXCISION. (Left) Diagnosis:       Mucous cyst of finger      (Mucous cyst of finger [R13.428])    Surgeons: Wu Brock MD Anesthesiologist: Harriett Dandy, Asim, DO    Anesthesia Type: MAC ASA Status: 2          Anesthesia Type: MAC    Vitals Value Taken Time   /61 05/12/23 1637   Temp 98.4 05/12/23 1637   Pulse 82 05/12/23 1637   Resp 16 05/12/23 1637   SpO2 97 05/12/23 1637       Patient Location: Same Day Surgery    Anesthesia Type: MAC    Airway Patency: patent    Postop Pain Control: adequate    Mental Status: preanesthetic baseline    Nausea/Vomiting: none    Cardiopulmonary/Hydration status: stable euvolemic    Complications: no apparent anesthesia related complications    Postop vital signs: stable    Dental Exam: Unchanged from Preop    Patient to be discharged home when criteria met.

## 2023-05-12 NOTE — INTERVAL H&P NOTE
Pre-op Diagnosis: Mucous cyst of finger [M67.449]    The above referenced H&P was reviewed by JADA Thomas on 5/12/2023, the patient was examined and no significant changes have occurred in the patient's condition since the H&P was performed. I discussed with the patient and/or legal representative the potential benefits, risks and side effects of this procedure; the likelihood of the patient achieving goals; and potential problems that might occur during recuperation. I discussed reasonable alternatives to the procedure, including risks, benefits and side effects related to the alternatives and risks related to not receiving this procedure. We will proceed with procedure as planned.

## 2023-05-13 ENCOUNTER — PATIENT MESSAGE (OUTPATIENT)
Dept: INTERNAL MEDICINE CLINIC | Facility: CLINIC | Age: 58
End: 2023-05-13

## 2023-05-15 NOTE — TELEPHONE ENCOUNTER
Pt and Dr Salvador Cho( Ortho Surgeon)  team left couple  Voicemail's since Tuesday last week to reach out to Nurses line or get an answer if she can hold her Wellbutrin and Naltrexone prior to her Hand surgery on last Friday 12th, she also mentioned that on VM that According to Dr Mulugeta Garcia Naltrexone can cause problems with the Anaesthesia ( Twilight), like Vomiting or any chemical interaction but not fully sure, so  regarding that she wanted to know from Dr Rayne Ayers if she can stop prior to her surgery. Today I checked, she had her surgery done on Last Friday the 12th May, doing well, only pain for that she is taking Tramadol as a PRN, she stopped taking her Naltrexone,as advised by Dr Mulugeta Garcia while she is on Tramadol. She also mentioned that Wellbutrin and Naltrexone is not helping her with Appetite suppression anymore, she wants to know if there any alternatives she can try.

## 2023-05-17 ENCOUNTER — TELEPHONE (OUTPATIENT)
Dept: ORTHOPEDICS CLINIC | Facility: CLINIC | Age: 58
End: 2023-05-17

## 2023-05-17 NOTE — TELEPHONE ENCOUNTER
Patient called stating that she had sx on 5/12 and will now be changing from her surgical Band-Aid to a regular Band-Aid this evening. Patient would like to know if she may get her hand wet or if she needs to wait until her sutures are removed on her post op appt on 5/23/2023. Please advise.

## 2023-05-17 NOTE — TELEPHONE ENCOUNTER
Spoke to patient and discussed keeping incision dry and covered until her follow up appointment. Patient verbalized understanding.     Future Appointments   Date Time Provider Natalio White   5/23/2023  8:40 AM JADA Ramirez [facility] [place]   6/9/2023  9:00 AM Mack Cam PA-C [facility]

## 2023-05-23 ENCOUNTER — OFFICE VISIT (OUTPATIENT)
Dept: ORTHOPEDICS CLINIC | Facility: CLINIC | Age: 58
End: 2023-05-23
Payer: COMMERCIAL

## 2023-05-23 VITALS — HEIGHT: 62 IN | WEIGHT: 145 LBS | BODY MASS INDEX: 26.68 KG/M2

## 2023-05-23 DIAGNOSIS — M67.449 MUCOUS CYST OF FINGER: Primary | ICD-10-CM

## 2023-05-23 PROCEDURE — 3008F BODY MASS INDEX DOCD: CPT | Performed by: PHYSICIAN ASSISTANT

## 2023-05-23 PROCEDURE — 99024 POSTOP FOLLOW-UP VISIT: CPT | Performed by: PHYSICIAN ASSISTANT

## 2023-06-09 ENCOUNTER — OFFICE VISIT (OUTPATIENT)
Dept: INTERNAL MEDICINE CLINIC | Facility: CLINIC | Age: 58
End: 2023-06-09
Payer: COMMERCIAL

## 2023-06-09 VITALS
SYSTOLIC BLOOD PRESSURE: 108 MMHG | BODY MASS INDEX: 25.87 KG/M2 | OXYGEN SATURATION: 98 % | DIASTOLIC BLOOD PRESSURE: 70 MMHG | HEART RATE: 82 BPM | RESPIRATION RATE: 16 BRPM | WEIGHT: 146 LBS | HEIGHT: 63 IN

## 2023-06-09 DIAGNOSIS — R63.8 CRAVING FOR PARTICULAR FOOD: ICD-10-CM

## 2023-06-09 DIAGNOSIS — E66.3 OVERWEIGHT (BMI 25.0-29.9): ICD-10-CM

## 2023-06-09 DIAGNOSIS — I10 ESSENTIAL HYPERTENSION: ICD-10-CM

## 2023-06-09 DIAGNOSIS — Z51.81 THERAPEUTIC DRUG MONITORING: Primary | ICD-10-CM

## 2023-06-09 PROCEDURE — 3078F DIAST BP <80 MM HG: CPT | Performed by: PHYSICIAN ASSISTANT

## 2023-06-09 PROCEDURE — 3008F BODY MASS INDEX DOCD: CPT | Performed by: PHYSICIAN ASSISTANT

## 2023-06-09 PROCEDURE — 99214 OFFICE O/P EST MOD 30 MIN: CPT | Performed by: PHYSICIAN ASSISTANT

## 2023-06-09 PROCEDURE — 3074F SYST BP LT 130 MM HG: CPT | Performed by: PHYSICIAN ASSISTANT

## 2023-06-09 RX ORDER — NALTREXONE HYDROCHLORIDE 50 MG/1
50 TABLET, FILM COATED ORAL DAILY
Qty: 90 TABLET | Refills: 1 | Status: SHIPPED | OUTPATIENT
Start: 2023-06-09

## 2023-06-09 RX ORDER — BUPROPION HYDROCHLORIDE 300 MG/1
300 TABLET ORAL DAILY
Qty: 90 TABLET | Refills: 1 | Status: SHIPPED | OUTPATIENT
Start: 2023-06-09

## 2023-06-09 RX ORDER — TOPIRAMATE 25 MG/1
25 TABLET ORAL 2 TIMES DAILY
Qty: 180 TABLET | Refills: 1 | Status: SHIPPED | OUTPATIENT
Start: 2023-06-09

## 2023-06-19 ENCOUNTER — OFFICE VISIT (OUTPATIENT)
Dept: ORTHOPEDICS CLINIC | Facility: CLINIC | Age: 58
End: 2023-06-19
Payer: COMMERCIAL

## 2023-06-19 VITALS — HEIGHT: 63 IN | WEIGHT: 146 LBS | BODY MASS INDEX: 25.87 KG/M2

## 2023-06-19 DIAGNOSIS — M67.449 MUCOUS CYST OF FINGER: Primary | ICD-10-CM

## 2023-06-19 PROCEDURE — 99024 POSTOP FOLLOW-UP VISIT: CPT | Performed by: ORTHOPAEDIC SURGERY

## 2023-06-19 PROCEDURE — 3008F BODY MASS INDEX DOCD: CPT | Performed by: ORTHOPAEDIC SURGERY

## 2023-12-22 RX ORDER — TOPIRAMATE 25 MG/1
25 TABLET ORAL 2 TIMES DAILY
Qty: 180 TABLET | Refills: 1 | OUTPATIENT
Start: 2023-12-22

## 2023-12-22 RX ORDER — BUPROPION HYDROCHLORIDE 300 MG/1
300 TABLET ORAL DAILY
Qty: 90 TABLET | Refills: 1 | OUTPATIENT
Start: 2023-12-22

## 2023-12-22 NOTE — TELEPHONE ENCOUNTER
Requesting   Requested Prescriptions     Pending Prescriptions Disp Refills    topiramate 25 MG Oral Tab 180 tablet 1     Sig: Take 1 tablet (25 mg total) by mouth 2 (two) times daily. LOV: 6/9/23  RTC: not noted  Filled: 6/9/23 #180 with 1 refills    No future appointments.     Need appt

## 2023-12-22 NOTE — TELEPHONE ENCOUNTER
Requesting   Requested Prescriptions     Pending Prescriptions Disp Refills    buPROPion  MG Oral Tablet 24 Hr 90 tablet 1     Sig: Take 1 tablet (300 mg total) by mouth daily. LOV: 6/9/23  RTC: not noted  Filled: 6/9/23 #90 with 1 refills    No future appointments.     Needs appt

## 2023-12-26 ENCOUNTER — TELEPHONE (OUTPATIENT)
Dept: INTERNAL MEDICINE CLINIC | Facility: CLINIC | Age: 58
End: 2023-12-26

## 2023-12-26 RX ORDER — BUPROPION HYDROCHLORIDE 300 MG/1
300 TABLET ORAL DAILY
Qty: 10 TABLET | Refills: 0 | Status: SHIPPED | OUTPATIENT
Start: 2023-12-26

## 2023-12-26 RX ORDER — TOPIRAMATE 25 MG/1
25 TABLET ORAL 2 TIMES DAILY
Qty: 20 TABLET | Refills: 0 | Status: SHIPPED | OUTPATIENT
Start: 2023-12-26

## 2023-12-26 NOTE — TELEPHONE ENCOUNTER
Patient needs follow up appointment last office visit was 6/19/23. Patient is upset that she can't get in asap the next open slot is in March, patient stated that she is running low on medication and that it isn't safe to off medication for 3 months and we need to figure it out on getting  her in asap and call her back! I offered to give appointment and explained the waitlist and she didn't want that. I did offer her VV for Jan 9th, her concern is being off the medication which is Wellbutrin and topiramate for 10 days. Please advise

## 2023-12-26 NOTE — TELEPHONE ENCOUNTER
Requesting   Requested Prescriptions     Pending Prescriptions Disp Refills    topiramate 25 MG Oral Tab 20 tablet 0     Sig: Take 1 tablet (25 mg total) by mouth 2 (two) times daily. buPROPion  MG Oral Tablet 24 Hr 10 tablet 0     Sig: Take 1 tablet (300 mg total) by mouth daily.       LOV: 6/9/23  RTC: n/a  Last Relevant Labs: none  Filled: 6/9/23 #180 with 1 refills   6/9/23 #90 with 1 refills    Future Appointments   Date Time Provider Natalio White   1/9/2024  2:20 PM Colin Parker PA-C [facility]        Short supply until patient appointment

## 2024-01-09 ENCOUNTER — TELEMEDICINE (OUTPATIENT)
Dept: INTERNAL MEDICINE CLINIC | Facility: CLINIC | Age: 59
End: 2024-01-09
Payer: COMMERCIAL

## 2024-01-09 DIAGNOSIS — I10 ESSENTIAL HYPERTENSION: ICD-10-CM

## 2024-01-09 DIAGNOSIS — E66.3 OVERWEIGHT (BMI 25.0-29.9): ICD-10-CM

## 2024-01-09 DIAGNOSIS — Z51.81 THERAPEUTIC DRUG MONITORING: Primary | ICD-10-CM

## 2024-01-09 PROCEDURE — 99213 OFFICE O/P EST LOW 20 MIN: CPT | Performed by: PHYSICIAN ASSISTANT

## 2024-01-09 RX ORDER — TOPIRAMATE 25 MG/1
25 TABLET ORAL 2 TIMES DAILY
Qty: 180 TABLET | Refills: 0 | Status: CANCELLED
Start: 2024-01-09

## 2024-01-09 RX ORDER — TOPIRAMATE 50 MG/1
50 TABLET, FILM COATED ORAL 2 TIMES DAILY
Qty: 180 TABLET | Refills: 1 | Status: SHIPPED | OUTPATIENT
Start: 2024-01-09

## 2024-01-09 RX ORDER — BUPROPION HYDROCHLORIDE 300 MG/1
300 TABLET ORAL DAILY
Qty: 90 TABLET | Refills: 1 | Status: SHIPPED | OUTPATIENT
Start: 2024-01-09

## 2024-01-09 RX ORDER — NALTREXONE HYDROCHLORIDE 50 MG/1
50 TABLET, FILM COATED ORAL DAILY
Qty: 90 TABLET | Refills: 1 | Status: SHIPPED | OUTPATIENT
Start: 2024-01-09

## 2024-01-09 NOTE — PROGRESS NOTES
This visit is conducted using Telemedicine with live, interactive video and audio.    Patient has been referred to the [facility] website at [email or web address] to review the yearly Consent to Treat document.    Patient understands and accepts financial responsibility for any deductible, co-insurance and/or co-pays associated with this service.      HISTORY OF PRESENT ILLNESS  Chief Complaint   Patient presents with    Weight Check       Camelia Waldrop is a 58 year old female here for follow up in medical weight loss program.   152lbs  Pt is compliant on naltrexone, wellbutrin, topamax  Denies chest pain, shortness of breath, dizziness, blurred vision, headache, paresthesia, nausea/vomiting.   Has been using MFP some  Joined fitness center at work  Feels like she has gotten complacent  Holiday parties and sweets, wasn't meeting with her , this  doesn't work with nutrition where her old  did    Exercise/Activity: 1 x a week with a   Nutrition: 24 hour food log reviewed, eating regular meals, +protein  Stress: 3/10  Sleep: 6 hours/night         Wt Readings from Last 6 Encounters:   06/19/23 146 lb (66.2 kg)   06/09/23 146 lb (66.2 kg)   05/23/23 145 lb (65.8 kg)   05/12/23 145 lb 12.8 oz (66.1 kg)   04/25/23 145 lb (65.8 kg)   02/27/23 145 lb (65.8 kg)            Breakfast Lunch Dinner Snacks Fluids              REVIEW OF SYSTEMS  GENERAL HEALTH: feels well otherwise, denied any fevers chills or night sweats   RESPIRATORY: denies shortness of breath   CARDIOVASCULAR: denies chest pain  GI: denies abdominal pain    EXAM  LMP  (LMP Unknown)   GENERAL: well developed, well nourished,in no apparent distress, A/O x3  SKIN: no rashes,no suspicious lesions on visible skin  HEENT: atraumatic, normocephalic  LUNGS: no increased effort or work with breathing   NEURO: speaking fluently and in clear sentences    Lab Results   Component Value Date    WBC 6.97 11/18/2020    RBC 4.46 11/18/2020    HGB  13.3 11/18/2020    HCT 40.0 11/18/2020    MCV 89.7 11/18/2020    MCH 29.8 11/18/2020    MCHC 33.3 11/18/2020    RDW 12.2 11/18/2020     11/18/2020     Lab Results   Component Value Date    GLU 85 05/21/2021    BUN 13 05/21/2021    BUNCREA 14.6 05/21/2021    CREATSERUM 0.89 05/21/2021    ANIONGAP 6 05/21/2021    GFR 82 03/05/2016    GFRNAA 73 05/21/2021    GFRAA 84 05/21/2021    CA 9.3 05/21/2021    OSMOCALC 291 05/21/2021    ALKPHO 88 05/21/2021    AST 16 05/21/2021    ALT 23 05/21/2021    BILT 0.4 05/21/2021    TP 7.5 05/21/2021    ALB 4.0 05/21/2021    GLOBULIN 3.5 05/21/2021    AGRATIO 2.0 03/19/2015     05/21/2021    K 3.7 05/21/2021     05/21/2021    CO2 26.0 05/21/2021     No results found for: \"EAG\", \"A1C\"  Lab Results   Component Value Date    CHOLEST 188.00 12/23/2020    TRIG 89.00 12/23/2020    HDL 65 12/23/2020     12/23/2020    VLDL 18 12/23/2020     Lab Results   Component Value Date    T4F 1.56 12/23/2020    TSH 0.519 07/16/2021     Lab Results   Component Value Date    B12 689 12/23/2020    VITB12 559 03/19/2015     Lab Results   Component Value Date    VITD 31.69 12/23/2020       Current Outpatient Medications on File Prior to Visit   Medication Sig Dispense Refill    topiramate 25 MG Oral Tab Take 1 tablet (25 mg total) by mouth 2 (two) times daily. 20 tablet 0    Benzonatate (TESSALON PERLES OR) Take by mouth as needed.      NURTEC 75 MG Oral Tablet Dispersible Place 1 tablet onto the tongue as needed.      diclofenac 50 MG Oral Tab EC Take 1 tablet (50 mg total) by mouth 2 (two) times daily as needed. 180 tablet 0    montelukast 10 MG Oral Tab Take 1 tablet (10 mg total) by mouth nightly. 90 tablet 0    Azelastine HCl 0.1 % Nasal Solution 2 sprays by Nasal route 2 (two) times daily. 90 mL 3    VALACYCLOVIR 500 MG Oral Tab TAKE 1 TABLET(500 MG) BY MOUTH TWICE DAILY (Patient taking differently: Take by mouth daily.) 180 tablet 0    Galcanezumab-gnlm (EMGALITY) 120 MG/ML  Subcutaneous Solution Auto-injector Inject 1 pen into the skin every 30 (thirty) days. 3 each 0    SYNTHROID 112 MCG Oral Tab Synthroid brand 112 mcg - 6 days per week but just HALF tablet on Sundays. (Patient taking differently: 5/5/23-pt states taking Levothyroxine 112 mcg - 6 days per week but just HALF tablet on Sundays.) 90 tablet 3    pregabalin 25 MG Oral Cap Take 2 capsules (50 mg total) by mouth 2 (two) times a day. 120 capsule 3    Respiratory Therapy Supplies (REPLACEMENT DISP NEBULIZER) Does not apply Kit Please dispense nebulizer kit plus tubing 1 kit 0    Acidophilus/Pectin Oral Cap Take 1 capsule by mouth daily.      Albuterol Sulfate  (90 Base) MCG/ACT Inhalation Aero Soln Inhale 2 puffs into the lungs every 4 (four) hours as needed. 1 Inhaler 1    EPINEPHrine HCl 1 MG/ML Injection Solution Inject 0.3 mL (0.3 mg total) into the skin.      Ipratropium Bromide 0.06 % Nasal Solution INSTILL 1-2 SPRAYS INTRANASALLY 3 TIMES A DAY AS NEEDED 1 Bottle 1    Saline Nasal Spray 0.65 % Nasal Solution 1 spray by Nasal route 2 (two) times daily. 30 mL 12    Magnesium Cl-Calcium Carbonate (SLOW-MAG) 71.5-119 MG Oral Tab EC Three tabs po daily 270 tablet 2    Multiple Vitamin (MULTI-VITAMIN) Oral Tab Take 1 tablet by mouth daily.      Omeprazole Magnesium (PRILOSEC OTC) 20 MG Oral Tab EC Take 1 tablet (20 mg total) by mouth daily as needed. 14 tablet 3    Peak Flow Meter (POCKET PEAK FLOW METER) Does not apply Device Use as needed 1 Device 0     No current facility-administered medications on file prior to visit.       ASSESSMENT  Analyzed weight data:       Diagnoses and all orders for this visit:    Therapeutic drug monitoring    Overweight (BMI 25.0-29.9)    Essential hypertension    Other orders  -     buPROPion  MG Oral Tablet 24 Hr; Take 1 tablet (300 mg total) by mouth daily.  -     naltrexone 50 MG Oral Tab; Take 1 tablet (50 mg total) by mouth daily.  -     topiramate 50 MG Oral Tab; Take 1  tablet (50 mg total) by mouth 2 (two) times daily.        PLAN  Initial Weight: 179lbs  Initial Weight Date: 3/25/21  Today's Weight: 152lbs  5% Goal: 8.95lbs  10% Goal: 17.9lbs  Total Weight Loss: Up 6lbs/Net loss 27lbs    Will continue wellbutrin, naltrexone and increase topamax - advised side effects and adverse effects of medication   Consistency with logging foods - focus on increasing protein  Discussed ways to increase exercise  Nutrition: low carb diet/ recommended to eat breakfast daily/ regular protein intake  Medication use and side effects reviewed with patient.  Medication contraindications: h/o thyroid cancer  Follow up with dietitian and psychologist as recommended.  Discussed the role of sleep and stress in weight management.  Counseled on comprehensive weight loss plan including attention to nutrition, exercise and behavior/stress management for success. See patient instruction below for more details.  Discussed strategies to overcome barriers to successful weight loss and weight maintenance  FITTE: ACSM recommendations (150-300 minutes/ week in active weight loss)   Weight Loss consent to treat reviewed and signed     There are no Patient Instructions on file for this visit.    No follow-ups on file.    Patient verbalizes understanding.    Chelsie Gore PA-C  1/9/2024    Please note that the following visit was completed using two-way, real-time interactive audio and video communication.  This has been done in good nelsy to provide continuity of care in the best interest of the provider-patient relationship, due to the ongoing public health crisis/national emergency and because of restrictions of visitation.  There are limitations of this visit as no physical exam could be performed.  Every conscious effort was taken to allow for sufficient and adequate time.  This billing was spent on reviewing labs, medications, radiology tests and decision making.  Appropriate medical decision-making and  tests are ordered as detailed in the plan of care above    Chelsie Gore PA-C

## 2024-01-18 ENCOUNTER — TELEPHONE (OUTPATIENT)
Dept: INTERNAL MEDICINE CLINIC | Facility: CLINIC | Age: 59
End: 2024-01-18

## 2024-01-18 NOTE — TELEPHONE ENCOUNTER
Patient called the office with complaint of eye pain.  She is on topamax - recently increased to 50 mg from 25 mg.  She has been on 25 mg for several months.  She is experiencing eye pain - it is in both eyes but not consistently and at the same time.  She is at risk for glaucoma and is concerned as topamax can cause eye problems.  I told patient to hold the medicine - and call her eye doctor.  He may need to examine her.  She will call them.  She was concerned about not tapering down the topamax dose and I explained her vision is more important than side effects from holding the topamax.  I further informed her that I would let Chelsie know and let her know if there is further concerns or protocol to follow.   Chelsie  notified of concerns and said patient is to stop the topamax - she does not expect side effects from stopping abruptly and she needs to consult eye doctor immediately.

## 2024-01-18 NOTE — TELEPHONE ENCOUNTER
I tried to call patient to make sure she got my message via my chart - just reiterating what was already discussed and the phone rings without answer.

## 2024-01-25 RX ORDER — BUPROPION HYDROCHLORIDE 300 MG/1
300 TABLET ORAL DAILY
Qty: 90 TABLET | Refills: 1 | OUTPATIENT
Start: 2024-01-25

## 2024-01-25 RX ORDER — TOPIRAMATE 25 MG/1
25 TABLET ORAL 2 TIMES DAILY
Qty: 180 TABLET | Refills: 1 | OUTPATIENT
Start: 2024-01-25

## 2024-03-19 PROBLEM — J06.9 VIRAL URI: Status: RESOLVED | Noted: 2018-01-29 | Resolved: 2024-03-19

## 2024-07-15 NOTE — TELEPHONE ENCOUNTER
Requesting   Requested Prescriptions     Pending Prescriptions Disp Refills    buPROPion  MG Oral Tablet 24 Hr 90 tablet 1     Sig: Take 1 tablet (300 mg total) by mouth daily.       LOV: 1/9/24 tele  RTC:   Last Relevant Labs:   Filled: 1/9/24 #90 with 1 refills    Future Appointments   Date Time Provider Department Center   8/14/2024  2:40 PM Chelsie Gore PA-C [facility] [address]

## 2024-07-16 RX ORDER — BUPROPION HYDROCHLORIDE 300 MG/1
300 TABLET ORAL DAILY
Qty: 90 TABLET | Refills: 1 | Status: SHIPPED | OUTPATIENT
Start: 2024-07-16

## 2024-08-21 ENCOUNTER — OFFICE VISIT (OUTPATIENT)
Facility: CLINIC | Age: 59
End: 2024-08-21
Payer: COMMERCIAL

## 2024-08-21 VITALS
BODY MASS INDEX: 28.35 KG/M2 | RESPIRATION RATE: 18 BRPM | SYSTOLIC BLOOD PRESSURE: 120 MMHG | HEIGHT: 63 IN | HEART RATE: 78 BPM | WEIGHT: 160 LBS | DIASTOLIC BLOOD PRESSURE: 70 MMHG

## 2024-08-21 DIAGNOSIS — E66.3 OVERWEIGHT (BMI 25.0-29.9): ICD-10-CM

## 2024-08-21 DIAGNOSIS — I10 ESSENTIAL HYPERTENSION: ICD-10-CM

## 2024-08-21 DIAGNOSIS — R63.2 BINGE EATING: ICD-10-CM

## 2024-08-21 DIAGNOSIS — Z51.81 THERAPEUTIC DRUG MONITORING: Primary | ICD-10-CM

## 2024-08-21 PROCEDURE — 3078F DIAST BP <80 MM HG: CPT | Performed by: PHYSICIAN ASSISTANT

## 2024-08-21 PROCEDURE — 99213 OFFICE O/P EST LOW 20 MIN: CPT | Performed by: PHYSICIAN ASSISTANT

## 2024-08-21 PROCEDURE — 3074F SYST BP LT 130 MM HG: CPT | Performed by: PHYSICIAN ASSISTANT

## 2024-08-21 PROCEDURE — 3008F BODY MASS INDEX DOCD: CPT | Performed by: PHYSICIAN ASSISTANT

## 2024-08-21 RX ORDER — NALTREXONE HYDROCHLORIDE 50 MG/1
50 TABLET, FILM COATED ORAL DAILY
Qty: 90 TABLET | Refills: 1 | Status: CANCELLED | OUTPATIENT
Start: 2024-08-21

## 2024-08-21 NOTE — PATIENT INSTRUCTIONS
7 min workout edna    Patient Instructions   RESOURCES   Atomic Habits -by Philip Rviera  The hungry brain: the science behind why we overeat    PODCASTS    School Podcasts by Christine Olivares   10 Percent Happier   Losing 100 lbs with Corinne   Brain over Binge by Nohelia Lara  Dishing Up Nutrition    Patient Resources:    Personal Training/Fitness Classes/Health Coaching    [facility] in [place]: Full fitness center with group fitness and personal training located in [place].  Health Coaching with Yasmine Waters, Hugh Correa, and Nic Siddiqiu at our [facility]- individual coaching to work on your health goals. Call [phone] and/or email @ [email or web address] Free 60 minute consult when client of [facility] [facility].  [facility] Kyle @ http://www.Pocket. A variety of group fitness options plus various yoga classes [phone] and/or email Kayleigh at [email or web address]  [facility] Fitness Centers with multiple locations: Root Metrics (www.Pantheon), F45 Training (www.r78qyylcjczSpace Ape), Fit Body Bootcamp (www.Widevine TechnologiesbodybootSL Pathology Leasing of Texasp.TUBE), AppleTreeBook (www.FanDuel), The Exercise  (www.exercisecoach.com), Club Pilates (www.clubSidecar.metes.TUBE)    Online Fitness  Fitness  on Utube  Fit in 10 DVD series   www.qhgek11CQJ.TUBE  Chair exercises via Sit and Be Fit (www.sitandbefit.org) and InvenQuery (www.Nordic Consumer Portals.com) or Charlie Banueols or Wilbur Leslie videos on YouTube.  Hip Hop Fit with Kaiser Cornejo at www.hiphopfit.net    Apps for on the Go Fitness  Boscobel 7 Minute Workout (orange box with white 7) - free on the go HIIT training edna  Peloton Edna @ www.onepeloton.com    Nutrition Trackers and Programs  LoseIT! And My Fitness Pal apps and on line for tracking nutrition  NOOM - virtual health coaching  FitFoundation (healthy meals on the go) in [place] @ Vitrinamrjmiaywayzbm0u.com  Hemanth WOLFE @  www.bistromd.com and Hfdlto18 (calorie smart and low carb plans recommended) @ www.amsjqz74.com, Metabolic Meals @ www.MyMetabolicMeals.com - individual prepared meals to go  Gobble, Blue Apron, Home , Every Plate, Sunbasket- on line meal delivery programs for preparation at home  [place] in [place] for homemade meals to go @ www.ProtAb.Azaleos  Diet Doctor @ www.dietdoctor.com - low carb swaps  YuLinguaLeo - meal prep and planning shivam (www.yummly.com)    Stress, Anxiety, Depression, Trauma  CALM meditation shivam (www.calm.com)  Headspace  Don't let anxiety run your life. Using the science of emotion regulation and mindfulness to overcome fear and worry by Dominic De La Cruz PsyD and Juan José Huynh MA.  The Host Analytics Podcast (September 27, 2023): 6 Magic Words That Stop Anxiety  What Happened to You?- a look at the impact trauma has on behavior written by Jenny Doan and Dr. Ye Bhatti  Whole Again by Mirza German - discovering your true self after trauma    Mindful Eating/The Hungry Brain  Am I Hungry? Mindful eating virtual  shivam (www.amihungry.com)  The Hungry Brain by Mary Anne Patterson, PhD  Mindless Eating by John Damon  Weight Loss Surgery Will Not Treat Food Addiction by Megan John Ph.D    Metabolic Dysfunction, Hormones and Cravings  Why We Get Sick? By Neymar Boykin (insulin resistance)  Your Body in Balance: The New Science of Food, Hormones, and Health by Dr. Wagner Wolf  The Complete Guide to fasting by Dr. Snell  Fast Like a Girl by Dr. Miya Saez  The Menopause Reset by Dr. Miya Saez  Sugar, Salt & Fat by Blaire Bey, Ph.D, R.D.  The Truth About Sugar - documentary on sugar (Free on CaptureSolar Energy, https://youBlueknowu.be/5K2plefFJ0a)  Reverse Visceral Fat: #1 Way to Increase Your Lifespan & End Inflammation with Dr. Michael Remy on Utube @ https://youBlueknowu.be/nupPRnvUpJY?si=og1hvmQyEEK5BlcI    Nutrition Support  You Are What You Eat - Ligia series on twin study looking at impact of nutrition  changes on health  The End of Dieting: How to Live for Life by Dr. Yung Dumont M.D. or listen to The SocialEngine Podcast Episode 63: Understanding \"Nutritarian\" Eating w/Dr. Yung Dumont  The Game Changers- Netflix Documentary on plant based nutrition  The Dr. Del Toro T5 Wellness Plan by Dr. Heron Del Toro MD  The Complete Guide to fasting by Dr. Snell  @[facility] ([facility] Dietician with support surrounding nutrition and meal prep/planning)    Education, Motivation and Support Resources  Live to 100: Secrets of the Blue Zones - Netflix series on the secrets to communities living over 100 years old  Atomic Habits by Philip Rivera (a book about taking small steps to promote greater behavior change)   Motivation shivam (black box with white \")- daily supportive messages sent to your phone  Can't Hurt Me by Dominic Martines (a book exploring the power of discipline in achieving your goals)  Fed Up - documentary about obesity (Free on Utube)  Www.yourweightmatters.org - Obesity Action Coalition sponsored Blog posts  Obesity Action Coalition Resources on topics specific to weight management (www.obesityaction.org)  Fitlosophy Fitspiration - journal to better health (journal book found at [employer] in fitness aisle)  Laura Weaver talk titled: The Call to Courage (Netflix)  The Exam Room by the Physician's Committee (Podcast)  Nutrition Facts by Dr. Armas (Podcast)

## 2024-08-21 NOTE — PROGRESS NOTES
HISTORY OF PRESENT ILLNESS  Chief Complaint   Patient presents with    Weight Check     +8lbs       Camelia Waldrop is a 59 year old female here for follow up in medical weight loss program.   +8lbs  Compliant on wellbutrin, naltrexone  Denies chest pain, shortness of breath, dizziness, blurred vision, headache, paresthesia, nausea/vomiting.   New granddaughter 8/9  Feels like she is struggling with motivation  Was seeing therapist through her and that has stopped, she sees a therapist but they don't work on food habits     Exercise/Activity: admits not as routine as previously  Nutrition: 24 hour food log reviewed, eating regular meals, +protein  Stress: manageable        Wt Readings from Last 6 Encounters:   08/21/24 160 lb (72.6 kg)   06/19/23 146 lb (66.2 kg)   06/09/23 146 lb (66.2 kg)   05/23/23 145 lb (65.8 kg)   05/12/23 145 lb 12.8 oz (66.1 kg)   04/25/23 145 lb (65.8 kg)            Breakfast Lunch Dinner Snacks Fluids   Reviewed           REVIEW OF SYSTEMS  GENERAL HEALTH: feels well otherwise, denied any fevers chills or night sweats   RESPIRATORY: denies shortness of breath   CARDIOVASCULAR: denies chest pain  GI: denies abdominal pain    EXAM  /70   Pulse 78   Resp 18   Ht 5' 3\" (1.6 m)   Wt 160 lb (72.6 kg)   LMP  (LMP Unknown)   BMI 28.34 kg/m²   GENERAL: well developed, well nourished,in no apparent distress, A/O x3  SKIN: no rashes,no suspicious lesions  HEENT: atraumatic, normocephalic, OP-clear, PERRL  NECK: supple,no adenopathy  LUNGS: clear to auscultation bilaterally   CARDIO: RRR without murmur  GI: good BS's,NT/ND, no masses or HSM  EXTREMITIES: no cyanosis, no clubbing, no edema    Lab Results   Component Value Date    WBC 6.97 11/18/2020    RBC 4.46 11/18/2020    HGB 13.3 11/18/2020    HCT 40.0 11/18/2020    MCV 89.7 11/18/2020    MCH 29.8 11/18/2020    MCHC 33.3 11/18/2020    RDW 12.2 11/18/2020     11/18/2020     Lab Results   Component Value Date    GLU 85 05/21/2021     BUN 13 05/21/2021    BUNCREA 14.6 05/21/2021    CREATSERUM 0.89 05/21/2021    ANIONGAP 6 05/21/2021    GFR 82 03/05/2016    GFRNAA 73 05/21/2021    GFRAA 84 05/21/2021    CA 9.3 05/21/2021    OSMOCALC 291 05/21/2021    ALKPHO 88 05/21/2021    AST 16 05/21/2021    ALT 23 05/21/2021    BILT 0.4 05/21/2021    TP 7.5 05/21/2021    ALB 4.0 05/21/2021    GLOBULIN 3.5 05/21/2021    AGRATIO 2.0 03/19/2015     05/21/2021    K 3.7 05/21/2021     05/21/2021    CO2 26.0 05/21/2021     No results found for: \"EAG\", \"A1C\"  Lab Results   Component Value Date    CHOLEST 188.00 12/23/2020    TRIG 89.00 12/23/2020    HDL 65 12/23/2020     12/23/2020    VLDL 18 12/23/2020     Lab Results   Component Value Date    T4F 1.56 12/23/2020    TSH 0.519 07/16/2021     Lab Results   Component Value Date    B12 689 12/23/2020    VITB12 559 03/19/2015     Lab Results   Component Value Date    VITD 31.69 12/23/2020       Current Outpatient Medications on File Prior to Visit   Medication Sig Dispense Refill    buPROPion  MG Oral Tablet 24 Hr Take 1 tablet (300 mg total) by mouth daily. 90 tablet 1    NURTEC 75 MG Oral Tablet Dispersible Place 1 tablet onto the tongue as needed.      diclofenac 50 MG Oral Tab EC Take 1 tablet (50 mg total) by mouth 2 (two) times daily as needed. 180 tablet 0    montelukast 10 MG Oral Tab Take 1 tablet (10 mg total) by mouth nightly. 90 tablet 0    Azelastine HCl 0.1 % Nasal Solution 2 sprays by Nasal route 2 (two) times daily. 90 mL 3    VALACYCLOVIR 500 MG Oral Tab TAKE 1 TABLET(500 MG) BY MOUTH TWICE DAILY (Patient taking differently: Take by mouth daily.) 180 tablet 0    Galcanezumab-gnlm (EMGALITY) 120 MG/ML Subcutaneous Solution Auto-injector Inject 1 pen into the skin every 30 (thirty) days. 3 each 0    SYNTHROID 112 MCG Oral Tab Synthroid brand 112 mcg - 6 days per week but just HALF tablet on Sundays. (Patient taking differently: 5/5/23-pt states taking Levothyroxine 112 mcg - 6  days per week but just HALF tablet on Sundays.) 90 tablet 3    pregabalin 25 MG Oral Cap Take 2 capsules (50 mg total) by mouth 2 (two) times a day. 120 capsule 3    Respiratory Therapy Supplies (REPLACEMENT DISP NEBULIZER) Does not apply Kit Please dispense nebulizer kit plus tubing 1 kit 0    EPINEPHrine HCl 1 MG/ML Injection Solution Inject 0.3 mL (0.3 mg total) into the skin.      Ipratropium Bromide 0.06 % Nasal Solution INSTILL 1-2 SPRAYS INTRANASALLY 3 TIMES A DAY AS NEEDED 1 Bottle 1    Saline Nasal Spray 0.65 % Nasal Solution 1 spray by Nasal route 2 (two) times daily. 30 mL 12    Magnesium Cl-Calcium Carbonate (SLOW-MAG) 71.5-119 MG Oral Tab EC Three tabs po daily 270 tablet 2    Multiple Vitamin (MULTI-VITAMIN) Oral Tab Take 1 tablet by mouth daily.      Omeprazole Magnesium (PRILOSEC OTC) 20 MG Oral Tab EC Take 1 tablet (20 mg total) by mouth daily as needed. 14 tablet 3    Peak Flow Meter (POCKET PEAK FLOW METER) Does not apply Device Use as needed 1 Device 0    topiramate 50 MG Oral Tab Take 1 tablet (50 mg total) by mouth 2 (two) times daily. 180 tablet 1    topiramate 25 MG Oral Tab Take 1 tablet (25 mg total) by mouth 2 (two) times daily. 20 tablet 0    Acidophilus/Pectin Oral Cap Take 1 capsule by mouth daily. (Patient not taking: Reported on 8/21/2024)      Albuterol Sulfate  (90 Base) MCG/ACT Inhalation Aero Soln Inhale 2 puffs into the lungs every 4 (four) hours as needed. (Patient not taking: Reported on 8/21/2024) 1 Inhaler 1     No current facility-administered medications on file prior to visit.       ASSESSMENT  Analyzed weight data:       Diagnoses and all orders for this visit:    Therapeutic drug monitoring    Overweight (BMI 25.0-29.9)    Binge eating    Essential hypertension        PLAN  Initial Weight: 179lbs  Initial Weight Date: 3/25/21  Today's Weight: 160lbs  5% Goal: 8.95lbs  10% Goal: 17.9lbs  Total Weight Loss: Up 8lbs/Net loss 19lbs    Will continue wellbutrin and  naltrexone - advised side effects and adverse effects of medication   Mindful eating  HTN - stable with lifestyle changes  Consistency with logging foods - protein and produce  Increase exercise, incorporate strength/resistance training  Nutrition: low carb diet/ recommended to eat breakfast daily/ regular protein intake  Medication use and side effects reviewed with patient.  Medication contraindications: h/o thyroid cancer, topamax  Follow up with dietitian and psychologist as recommended.  Discussed the role of sleep and stress in weight management.  Counseled on comprehensive weight loss plan including attention to nutrition, exercise and behavior/stress management for success. See patient instruction below for more details.  Discussed strategies to overcome barriers to successful weight loss and weight maintenance  FITTE: ACSM recommendations (150-300 minutes/ week in active weight loss)   Weight Loss consent to treat reviewed and signed       NOTE TO PATIENT: The 21st Century Cures Act makes clinical notes like these available to patients in the interest of transparency. Clinical notes are medical documents used by physicians and care providers to communicate with each other. These documents include medical language and terminology, abbreviations, and treatment information that may sound technical and at times possibly unfamiliar. In addition, at times, the verbiage may appear blunt or direct. These documents are one tool providers use to communicate relevant information and clinical opinions of the care providers in a way that allows common understanding of the clinical context.     Patient Instructions   7 min workout shivam    Patient Instructions   RESOURCES   Atomic Habits -by Philip Rivera  The hungry brain: the science behind why we overeat    PODCASTS    School Podcasts by Christine Olivares   10 Percent Happier   Losing 100 lbs with Corinne   Brain over Binge by Nohelia Lara  Dishing Up  Nutrition    Patient Resources:    Personal Training/Fitness Classes/Health Coaching    [facility] in [place]: Full fitness center with group fitness and personal training located in [place].  Health Coaching with Yasmine Waters, Hugh Correa, and Nic Siddiqui at our [facility]- individual coaching to work on your health goals. Call [phone] and/or email @ [email or web address]. Free 60 minute consult when client of [employer] [facility].  [facility] [place] @ http://www.TRIBAX. A variety of group fitness options plus various yoga classes [phone] and/or email Kayleigh at [email or web address]  [facility] Fitness Centers with multiple locations: Insight Guru (www.Mechanology), RMI Corporation5 Training (www.Lasso), 5skills Body Bootcamp (www.Jana Mobile.MixRank), Find Invest Grow (FIG) (www.Visualant), The Exercise  (www.exercisecoach.com), Club Cargoh.com (www.AppSlingr.MixRank)    Online Fitness  Fitness  on Lentigen  Fit in 10 DVD series   www.Agennix  Chair exercises via Sit and Be Fit (www.sitandbefit.org) and tribr (www.Sococo) or Charlie Banuelos or Wilbur Leslie videos on YouTube.  Hip Hop Fit with Kaiser Cornejo at www.hiphopfit.Instabeat    Apps for on the Go Fitness  [place] 7 Minute Workout (orange box with white 7) - free on the go HIIT training edna  Peloton Edna @ www.onepeloton.com    Nutrition Trackers and Programs  LoseIT! And My Fitness Pal apps and on line for tracking nutrition  NOOM - virtual health coaching  FitFoundation (healthy meals on the go) in [place] @ www.pierevwlohjyk4i.MixRank  Hemanth WOLFE @ www.bistrPCA Auditd.MixRank and Kcapvp11 (calorie smart and low carb plans recommended) @ www.imphea39.com, Metabolic Meals @ www.pSiFlow TechnologyMetabolicMeals.com - individual prepared meals to go  Gobble, Blue Apron, Home , Every Plate, Sunbasket- on line meal delivery programs for preparation at home  Meal Candy in  [place] for homemade meals to go @ www.mealCequent Pharmaceuticalsllage.com  Diet Doctor @ www.dietdoctor.com - low carb swaps  Vocalytics - meal prep and planning shivam (www.yummly.com)    Stress, Anxiety, Depression, Trauma  CALM meditation shivam (www.calm.com)  Headspace  Don't let anxiety run your life. Using the science of emotion regulation and mindfulness to overcome fear and worry by Dominic De La Cruz PsyD and Juan José Huynh MA.  The [employer] Podcast (September 27, 2023): 6 Magic Words That Stop Anxiety  What Happened to You?- a look at the impact trauma has on behavior written by Jenny Doan and Dr. Ye Bhatti  Whole Again by Mirza German - discovering your true self after trauma    Mindful Eating/The Hungry Brain  Am I Hungry? Mindful eating virtual  shivam (www.Eventus Software Pvt.com)  The Hungry Brain by Mary Anne Patterson, PhD  Mindless Eating by John Damon  Weight Loss Surgery Will Not Treat Food Addiction by Megan John Ph.D    Metabolic Dysfunction, Hormones and Cravings  Why We Get Sick? By Neymar Boykin (insulin resistance)  Your Body in Balance: The New Science of Food, Hormones, and Health by Dr. Wagner Wolf  The Complete Guide to fasting by Dr. Snell  Fast Like a Girl by Dr. Miya Saez  The Menopause Reset by Dr. Miya Saez  Sugar, Salt & Fat by Blaire Bey, Ph.D, R.D.  The Truth About Sugar - documentary on sugar (Free on [employer] Performance Solutions, https://youtu.be/3W3gneqVM8y)  Reverse Visceral Fat: #1 Way to Increase Your Lifespan & End Inflammation with Dr. Michael Remy on Utube @ https://youIdea Deviceu.be/nupPRnvUpJY?si=sg6vheEqPUR5VsaT    Nutrition Support  You Are What You Eat - Netfix series on twin study looking at impact of nutrition changes on health  The End of Dieting: How to Live for Life by Dr. Yung Dumont M.D. or listen to The StreetfaireHD Podcast Episode 63: Understanding \"Nutritarian\" Eating w/Dr. Yung Dumont  The Game Changers- OrSense Documentary on plant based nutrition  The Dr. Del Toro T5 Wellness Plan by   Heron Del Toro MD  The Complete Guide to fasting by Dr. Snell  @[facility] ([facility] Dietician with support surrounding nutrition and meal prep/planning)    Education, Motivation and Support Resources  Live to 100: Secrets of the Blue Zones - Netflix series on the secrets to communities living over 100 years old  Atomic Habits by Philip Rivera (a book about taking small steps to promote greater behavior change)   Motivation shivam (black box with white \")- daily supportive messages sent to your phone  Can't Hurt Me by Dominic Martines (a book exploring the power of discipline in achieving your goals)  Fed Up - documentary about obesity (Free on Utube)  Www.yourweightmatters.org - Obesity Action Coalition sponsored Blog posts  Obesity Action Coalition Resources on topics specific to weight management (www.obesityaction.org)  Fitlosophy Fitspiration - journal to better health (journal book found at [employer] in fitness aisle)  Laura Weaver talk titled: The Call to Courage (Netflix)  The Exam Room by the Physician's Committee (Podcast)  Nutrition Facts by Dr. Armas (Podcast)      No follow-ups on file.    Patient verbalizes understanding.    Chelsie Gore PA-C  8/21/2024

## 2024-08-23 RX ORDER — NALTREXONE HYDROCHLORIDE 50 MG/1
25 TABLET, FILM COATED ORAL 2 TIMES DAILY
Qty: 90 TABLET | Refills: 1 | Status: SHIPPED | OUTPATIENT
Start: 2024-08-23

## 2024-08-23 NOTE — TELEPHONE ENCOUNTER
Requesting   Requested Prescriptions     Pending Prescriptions Disp Refills    naltrexone 50 MG Oral Tab 90 tablet 1     Sig: Take 1 tablet (50 mg total) by mouth daily.      LOV: 08/21/24  RTC: 3mo  Last Relevant Labs:   Filled: 01/09/24 #90 with 1 refills    Future Appointments   Date Time Provider Department Center   12/3/2024 12:00 PM Chelsie Gore, LISA [facility] [address]

## 2024-12-03 ENCOUNTER — TELEMEDICINE (OUTPATIENT)
Facility: CLINIC | Age: 59
End: 2024-12-03
Payer: COMMERCIAL

## 2024-12-03 DIAGNOSIS — E66.3 OVERWEIGHT (BMI 25.0-29.9): ICD-10-CM

## 2024-12-03 DIAGNOSIS — Z51.81 THERAPEUTIC DRUG MONITORING: Primary | ICD-10-CM

## 2024-12-03 DIAGNOSIS — R63.2 BINGE EATING: ICD-10-CM

## 2024-12-03 DIAGNOSIS — I10 ESSENTIAL HYPERTENSION: ICD-10-CM

## 2024-12-03 PROCEDURE — 99213 OFFICE O/P EST LOW 20 MIN: CPT | Performed by: PHYSICIAN ASSISTANT

## 2024-12-03 RX ORDER — BUPROPION HYDROCHLORIDE 300 MG/1
300 TABLET ORAL DAILY
Qty: 90 TABLET | Refills: 1 | Status: SHIPPED | OUTPATIENT
Start: 2024-12-03

## 2024-12-03 RX ORDER — NALTREXONE HYDROCHLORIDE 50 MG/1
25 TABLET, FILM COATED ORAL 2 TIMES DAILY
Qty: 90 TABLET | Refills: 1 | Status: SHIPPED | OUTPATIENT
Start: 2024-12-03

## 2024-12-03 NOTE — PROGRESS NOTES
This visit is conducted using Telemedicine with live, interactive video and audio.    Patient has been referred to the [facility] website at [email or web address] to review the yearly Consent to Treat document.    Patient understands and accepts financial responsibility for any deductible, co-insurance and/or co-pays associated with this service.      HISTORY OF PRESENT ILLNESS  Chief Complaint   Patient presents with    Weight Check       Camelia Waldrop is a 59 year old female here for follow up in medical weight loss program.   158lbs  Pt is compliant on wellbutrin and naltrexone  Denies chest pain, shortness of breath, dizziness, blurred vision, headache, paresthesia, nausea/vomiting.   Was just diagnosed with osteopenia, has started to incorporate more weight bearing exercise  Doing better with getting protein in and food choices    Exercise/Activity: 7 min workout shivam,   Nutrition: 24 hour food log reviewed, eating regular meals, +protein  Stress: 7/10  Sleep: 7 hours/night     [facility] Follow Up    General Information  Success Moment: Not great  Challenging Moment: Lack of motivation to stay disciplined with routine  Nutrition Recall  Breakfast: Plain Greek skim Lactose free yogurt with fresh fruit and   sometimes honey. Or egg whites with sautéed vegetables Lunch: Some type if   protein like chicken or turkey, 1/4 cup rice, 1/2 cup veggies   Dinner: 4-6 oz protein (chicken mostly or fish or beans), 1/2 cup rice   sometimes, 1/2 cup veggies or more Snacks: Coffee (with stevia) and   unsweetened almond milk, Kind bar, or cashews and craisins, or raw carrots   and celery with ranch dressing   Fluids: 24-36 oz combined of water, coffee, juice or hot lemon water with   tumeric latte powder mixed in Dining Out: 3   Exercise   Patient stated exercises # days/week: 4  Patient stated perceived level of   exertion: 3 Anaerobic Days: 3   Aerobic Days: 1   Patient stated average level of stress: 7  Sleep   Patient stated #  hours uninterrupted sleep: 7   Patient stated feels   restful: Yes      Cause of disruption of sleep: Sometimes my mind wanders or I stay ip too   late or  is snoring   Goals: To cut back on sugar                Wt Readings from Last 6 Encounters:   08/21/24 160 lb (72.6 kg)   06/19/23 146 lb (66.2 kg)   06/09/23 146 lb (66.2 kg)   05/23/23 145 lb (65.8 kg)   05/12/23 145 lb 12.8 oz (66.1 kg)   04/25/23 145 lb (65.8 kg)            Breakfast Lunch Dinner Snacks Fluids              REVIEW OF SYSTEMS  GENERAL HEALTH: feels well otherwise, denied any fevers chills or night sweats   RESPIRATORY: denies shortness of breath   CARDIOVASCULAR: denies chest pain  GI: denies abdominal pain    EXAM  LMP  (LMP Unknown)   GENERAL: well developed, well nourished,in no apparent distress, A/O x3  SKIN: no rashes,no suspicious lesions on visible skin  HEENT: atraumatic, normocephalic  LUNGS: no increased effort or work with breathing   NEURO: speaking fluently and in clear sentences    Lab Results   Component Value Date    WBC 6.97 11/18/2020    RBC 4.46 11/18/2020    HGB 13.3 11/18/2020    HCT 40.0 11/18/2020    MCV 89.7 11/18/2020    MCH 29.8 11/18/2020    MCHC 33.3 11/18/2020    RDW 12.2 11/18/2020     11/18/2020     Lab Results   Component Value Date    GLU 85 05/21/2021    BUN 13 05/21/2021    BUNCREA 14.6 05/21/2021    CREATSERUM 0.89 05/21/2021    ANIONGAP 6 05/21/2021    GFR 82 03/05/2016    GFRNAA 73 05/21/2021    GFRAA 84 05/21/2021    CA 9.3 05/21/2021    OSMOCALC 291 05/21/2021    ALKPHO 88 05/21/2021    AST 16 05/21/2021    ALT 23 05/21/2021    BILT 0.4 05/21/2021    TP 7.5 05/21/2021    ALB 4.0 05/21/2021    GLOBULIN 3.5 05/21/2021    AGRATIO 2.0 03/19/2015     05/21/2021    K 3.7 05/21/2021     05/21/2021    CO2 26.0 05/21/2021     No results found for: \"EAG\", \"A1C\"  Lab Results   Component Value Date    CHOLEST 188.00 12/23/2020    TRIG 89.00 12/23/2020    HDL 65 12/23/2020      12/23/2020    VLDL 18 12/23/2020     Lab Results   Component Value Date    T4F 1.56 12/23/2020    TSH 0.519 07/16/2021     Lab Results   Component Value Date    B12 689 12/23/2020    VITB12 559 03/19/2015     Lab Results   Component Value Date    VITD 31.69 12/23/2020       Medications Ordered Prior to Encounter[1]    ASSESSMENT  Analyzed weight data:       Diagnoses and all orders for this visit:    Therapeutic drug monitoring  -     buPROPion  MG Oral Tablet 24 Hr; Take 1 tablet (300 mg total) by mouth daily.  -     naltrexone 50 MG Oral Tab; Take 0.5 tablets (25 mg total) by mouth in the morning and 0.5 tablets (25 mg total) before bedtime.    Overweight (BMI 25.0-29.9)  -     buPROPion  MG Oral Tablet 24 Hr; Take 1 tablet (300 mg total) by mouth daily.  -     naltrexone 50 MG Oral Tab; Take 0.5 tablets (25 mg total) by mouth in the morning and 0.5 tablets (25 mg total) before bedtime.    Binge eating  -     buPROPion  MG Oral Tablet 24 Hr; Take 1 tablet (300 mg total) by mouth daily.  -     naltrexone 50 MG Oral Tab; Take 0.5 tablets (25 mg total) by mouth in the morning and 0.5 tablets (25 mg total) before bedtime.    Essential hypertension        PLAN  Initial Weight: 179lbs  Initial Weight Date: 3/25/21  Today's Weight: 158lbs  5% Goal: 8.95lbs  10% Goal: 17.9lbs  Total Weight Loss: -2lbs/Net loss 21lbs    Will continue wellbutrin and natrexone - advised side effects and adverse effects of medication   Continue to work on mindful eating  HTN - stable with lifestyle changes  Consistency with logging foods - protein and produce  Continue to increase strength/resistance training  Nutrition: low carb diet/ recommended to eat breakfast daily/ regular protein intake  Medication use and side effects reviewed with patient.  Medication contraindications: h/o thyroid cancer, topamax  Follow up with dietitian and psychologist as recommended.  Discussed the role of sleep and stress in weight  management.  Counseled on comprehensive weight loss plan including attention to nutrition, exercise and behavior/stress management for success. See patient instruction below for more details.  Discussed strategies to overcome barriers to successful weight loss and weight maintenance  FITTE: ACSM recommendations (150-300 minutes/ week in active weight loss)   Weight Loss consent to treat reviewed and signed     There are no Patient Instructions on file for this visit.    No follow-ups on file.    Patient verbalizes understanding.    Chelsie Gore PA-C  12/3/2024    Please note that the following visit was completed using two-way, real-time interactive audio and video communication.  This has been done in good nelsy to provide continuity of care in the best interest of the provider-patient relationship, due to the ongoing public health crisis/national emergency and because of restrictions of visitation.  There are limitations of this visit as no physical exam could be performed.  Every conscious effort was taken to allow for sufficient and adequate time.  This billing was spent on reviewing labs, medications, radiology tests and decision making.  Appropriate medical decision-making and tests are ordered as detailed in the plan of care above    Chelsie Gore PA-C           [1]   Current Outpatient Medications on File Prior to Visit   Medication Sig Dispense Refill    topiramate 50 MG Oral Tab Take 1 tablet (50 mg total) by mouth 2 (two) times daily. 180 tablet 1    topiramate 25 MG Oral Tab Take 1 tablet (25 mg total) by mouth 2 (two) times daily. 20 tablet 0    NURTEC 75 MG Oral Tablet Dispersible Place 1 tablet onto the tongue as needed.      diclofenac 50 MG Oral Tab EC Take 1 tablet (50 mg total) by mouth 2 (two) times daily as needed. 180 tablet 0    montelukast 10 MG Oral Tab Take 1 tablet (10 mg total) by mouth nightly. 90 tablet 0    Azelastine HCl 0.1 % Nasal Solution 2 sprays by Nasal route 2 (two)  times daily. 90 mL 3    VALACYCLOVIR 500 MG Oral Tab TAKE 1 TABLET(500 MG) BY MOUTH TWICE DAILY (Patient taking differently: Take by mouth daily.) 180 tablet 0    Galcanezumab-gnlm (EMGALITY) 120 MG/ML Subcutaneous Solution Auto-injector Inject 1 pen into the skin every 30 (thirty) days. 3 each 0    SYNTHROID 112 MCG Oral Tab Synthroid brand 112 mcg - 6 days per week but just HALF tablet on Sundays. (Patient taking differently: 5/5/23-pt states taking Levothyroxine 112 mcg - 6 days per week but just HALF tablet on Sundays.) 90 tablet 3    pregabalin 25 MG Oral Cap Take 2 capsules (50 mg total) by mouth 2 (two) times a day. 120 capsule 3    Respiratory Therapy Supplies (REPLACEMENT DISP NEBULIZER) Does not apply Kit Please dispense nebulizer kit plus tubing 1 kit 0    Acidophilus/Pectin Oral Cap Take 1 capsule by mouth daily. (Patient not taking: Reported on 8/21/2024)      Albuterol Sulfate  (90 Base) MCG/ACT Inhalation Aero Soln Inhale 2 puffs into the lungs every 4 (four) hours as needed. (Patient not taking: Reported on 8/21/2024) 1 Inhaler 1    EPINEPHrine HCl 1 MG/ML Injection Solution Inject 0.3 mL (0.3 mg total) into the skin.      Ipratropium Bromide 0.06 % Nasal Solution INSTILL 1-2 SPRAYS INTRANASALLY 3 TIMES A DAY AS NEEDED 1 Bottle 1    Saline Nasal Spray 0.65 % Nasal Solution 1 spray by Nasal route 2 (two) times daily. 30 mL 12    Magnesium Cl-Calcium Carbonate (SLOW-MAG) 71.5-119 MG Oral Tab EC Three tabs po daily 270 tablet 2    Multiple Vitamin (MULTI-VITAMIN) Oral Tab Take 1 tablet by mouth daily.      Omeprazole Magnesium (PRILOSEC OTC) 20 MG Oral Tab EC Take 1 tablet (20 mg total) by mouth daily as needed. 14 tablet 3    Peak Flow Meter (POCKET PEAK FLOW METER) Does not apply Device Use as needed 1 Device 0     No current facility-administered medications on file prior to visit.

## 2024-12-30 ENCOUNTER — TELEPHONE (OUTPATIENT)
Facility: CLINIC | Age: 59
End: 2024-12-30

## 2024-12-30 DIAGNOSIS — M79.641 RIGHT HAND PAIN: Primary | ICD-10-CM

## 2024-12-30 NOTE — TELEPHONE ENCOUNTER
Patient scheduled online for top of right hand injury causing pain.   Future Appointments   Date Time Provider Department Center   1/29/2025  1:30 PM Slade Menon MD [facility] [place]       Please advise if imaging is needed.

## 2025-01-27 ENCOUNTER — TELEPHONE (OUTPATIENT)
Dept: ORTHOPEDICS CLINIC | Facility: CLINIC | Age: 60
End: 2025-01-27

## 2025-01-27 DIAGNOSIS — M79.642 LEFT HAND PAIN: Primary | ICD-10-CM

## 2025-01-27 NOTE — TELEPHONE ENCOUNTER
Patient of Dr. Menon's requesting to be seen for L hand/thumb pain. Seen in UC on 1/26/25.    Advised patient to arrive 20 min early for possible imaging, if needed.    Please advise, thanks.    Future Appointments   Date Time Provider Department Center   1/29/2025  8:20 AM La Gates PA [facility] [place]

## 2025-01-29 ENCOUNTER — HOSPITAL ENCOUNTER (OUTPATIENT)
Dept: GENERAL RADIOLOGY | Age: 60
Discharge: HOME | End: 2025-01-29
Attending: PHYSICIAN ASSISTANT
Payer: COMMERCIAL

## 2025-01-29 ENCOUNTER — OFFICE VISIT (OUTPATIENT)
Dept: ORTHOPEDICS CLINIC | Facility: CLINIC | Age: 60
End: 2025-01-29
Payer: COMMERCIAL

## 2025-01-29 VITALS — HEIGHT: 63 IN | BODY MASS INDEX: 28.35 KG/M2 | WEIGHT: 160 LBS

## 2025-01-29 DIAGNOSIS — M18.12 PRIMARY OSTEOARTHRITIS OF FIRST CARPOMETACARPAL JOINT OF LEFT HAND: Primary | ICD-10-CM

## 2025-01-29 DIAGNOSIS — M79.642 LEFT HAND PAIN: ICD-10-CM

## 2025-01-29 PROCEDURE — 3008F BODY MASS INDEX DOCD: CPT | Performed by: PHYSICIAN ASSISTANT

## 2025-01-29 PROCEDURE — 73130 X-RAY EXAM OF HAND: CPT | Performed by: PHYSICIAN ASSISTANT

## 2025-01-29 PROCEDURE — 99213 OFFICE O/P EST LOW 20 MIN: CPT | Performed by: PHYSICIAN ASSISTANT

## 2025-01-29 RX ORDER — MELOXICAM 7.5 MG/1
7.5 TABLET ORAL EVERY 12 HOURS PRN
COMMUNITY
Start: 2025-01-27

## 2025-01-29 NOTE — PROGRESS NOTES
Clinic Note [facility]     Assessment/Plan:  59 year old female    Left index finger mucous cyst excision 5/12/2023-doing well and happy with her recovery.  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  Bedside ultrasound did confirm instability of the ECU tendon.  At this point we are recommending observation since its minimally symptomatic  Left thumb CMC joint osteoarthritis-we discussed conservative management and she will start with a CMC push MetaGrip.  She is also already on meloxicam so should continue to take that.  Did discuss cortisone in the future if it does not improve briefly discussed surgery.  All of her questions were answered    Follow Up: As needed    Diagnostic Studies:     X-ray 3 views: Degenerative changes of the DIP joint are noted.  Thumb CMC joint narrowing with bone spurs.       Physical Exam:     Ht 5' 3\" (1.6 m)   Wt 160 lb (72.6 kg)   LMP  (LMP Unknown)   BMI 28.34 kg/m²     Constitutional: NAD. AOx3. Well-developed and Well-nourished.   Psychiatric: Normal mood/ affect/ behavior. Judgment and thought content normal.     Left Upper Extremity:     Inspection    Incisions healing well with no signs of infection   Palpation  Tender over the thumb CMC joint with positive relocation test.  Nontender over the rest the  wrist.      ROM    Full composite fist., Normal symmetric wrist motion. and Normal symmetric elbow motion.     Neurovascular    The hand is normally perfused and normally sensate     Special    Ulnar Sided Wrist Exam    Fovea Sign neg Piano Key neg   TFC Compression neg DRUJ Compression neg   TFC Tension neg LT Shear neg   ECU Synergy neg Hook Hamate neg   ECU Subluxation + Pisotriquetral Grind  neg               CC: Left index finger cyst, left wrist pain    HPI: This 59 year old RHD female presents with left index finger mucous cyst that enlarged and painful.  She feels like it is about 2 pop and drain fluid.  She is worried about recurrent  drainage for risk of infection and osteomyelitis.  She also reports sudden sharp pain over the ulnar side of the left wrist.  It started after a fall/injury back in 2021.    Occupation: [employer]    Interval history: Patient complains of left thumb pain ongoing a few weeks and worsening over the last few days.  She went to urgent care on Saturday and they gave her a thumb brace to help with her pain.  She does feel that there is some improvement.  She feels like it could be from guitar use.    History/Other:   Past Medical History:    Alcohol abuse, in remission    Asthma (HCC)    Cancer (HCC)    thyroid cancer     Chronic rhinitis    Endometrial hyperplasia    Endometriosis    hysterectomy    Esophageal reflux    Glaucoma suspect    Hashimoto's thyroiditis    Latent tuberculosis    Menorrhagia    Multinodular goiter (nontoxic)    Ocular migraine    Syncope     Past Surgical History:   Procedure Laterality Date    Biopsy of thyroid,percut  6/19/2012    Biopsy of thyroid,percut  4/2013    Colonoscopy  3/4/13 - KEITH Gaines    hemorrhoids, repeat 2023 w/MAC.    D & c  2008    Endometr ablate, thermal  2009    Hysterectomy  7/2010    KAI, single oophorectomy (PCOS, hemorrhagic cyst)     Impact tooth rem bony w/comp Bilateral 1983    wisdom teeth x 3    Nm thyroid i-131 therapy for hyperthyroidism int  (nxx=52516)  2/5/2015    51     Osteochondral talus autogrft  11/30/2011    right ankle    Other surgical history Right     ankle surgery     Thyroidectomy,malig,ltd neck surg      Total abdom hysterectomy      Upper gi endoscopy - referral  3/4/13 - KEITH Gaines    normal EGD.      Current Outpatient Medications   Medication Sig Dispense Refill    Meloxicam 7.5 MG Oral Tab Take 1 tablet (7.5 mg total) by mouth every 12 (twelve) hours as needed for Pain.      buPROPion  MG Oral Tablet 24 Hr Take 1 tablet (300 mg total) by mouth daily. 90 tablet 1    naltrexone 50 MG Oral Tab Take 0.5 tablets (25 mg total) by mouth in  the morning and 0.5 tablets (25 mg total) before bedtime. 90 tablet 1    NURTEC 75 MG Oral Tablet Dispersible Place 1 tablet onto the tongue as needed.      montelukast 10 MG Oral Tab Take 1 tablet (10 mg total) by mouth nightly. 90 tablet 0    Azelastine HCl 0.1 % Nasal Solution 2 sprays by Nasal route 2 (two) times daily. 90 mL 3    VALACYCLOVIR 500 MG Oral Tab TAKE 1 TABLET(500 MG) BY MOUTH TWICE DAILY (Patient taking differently: Take by mouth daily.) 180 tablet 0    Galcanezumab-gnlm (EMGALITY) 120 MG/ML Subcutaneous Solution Auto-injector Inject 1 pen into the skin every 30 (thirty) days. 3 each 0    SYNTHROID 112 MCG Oral Tab Synthroid brand 112 mcg - 6 days per week but just HALF tablet on Sundays. (Patient taking differently: 5/5/23-pt states taking Levothyroxine 112 mcg - 6 days per week but just HALF tablet on Sundays.) 90 tablet 3    pregabalin 25 MG Oral Cap Take 2 capsules (50 mg total) by mouth 2 (two) times a day. 120 capsule 3    Respiratory Therapy Supplies (REPLACEMENT DISP NEBULIZER) Does not apply Kit Please dispense nebulizer kit plus tubing 1 kit 0    EPINEPHrine HCl 1 MG/ML Injection Solution Inject 0.3 mL (0.3 mg total) into the skin.      Ipratropium Bromide 0.06 % Nasal Solution INSTILL 1-2 SPRAYS INTRANASALLY 3 TIMES A DAY AS NEEDED 1 Bottle 1    Saline Nasal Spray 0.65 % Nasal Solution 1 spray by Nasal route 2 (two) times daily. 30 mL 12    Magnesium Cl-Calcium Carbonate (SLOW-MAG) 71.5-119 MG Oral Tab EC Three tabs po daily 270 tablet 2    Multiple Vitamin (MULTI-VITAMIN) Oral Tab Take 1 tablet by mouth daily.      Omeprazole Magnesium (PRILOSEC OTC) 20 MG Oral Tab EC Take 1 tablet (20 mg total) by mouth daily as needed. 14 tablet 3    Peak Flow Meter (POCKET PEAK FLOW METER) Does not apply Device Use as needed 1 Device 0    topiramate 50 MG Oral Tab Take 1 tablet (50 mg total) by mouth 2 (two) times daily. 180 tablet 1    topiramate 25 MG Oral Tab Take 1 tablet (25 mg total) by mouth 2  (two) times daily. 20 tablet 0    diclofenac 50 MG Oral Tab EC Take 1 tablet (50 mg total) by mouth 2 (two) times daily as needed. (Patient not taking: Reported on 1/29/2025) 180 tablet 0    Acidophilus/Pectin Oral Cap Take 1 capsule by mouth daily. (Patient not taking: Reported on 1/29/2025)      Albuterol Sulfate  (90 Base) MCG/ACT Inhalation Aero Soln Inhale 2 puffs into the lungs every 4 (four) hours as needed. (Patient not taking: Reported on 1/29/2025) 1 Inhaler 1     Allergies   Allergen Reactions    Dust Mites HIVES and ITCHING    Ragweed Coughing    Mold Coughing     Family History   Problem Relation Age of Onset    Lung Disorder Father         COPD    Lung Disorder Mother         COPD    Stroke Mother     Neurological Disorder Mother         migraine    Eye Problems Mother         cataracts    Asthma Mother     Musculo-skelatal Disorder Daughter         spinal fusion    Asthma Daughter     PTSD Daughter     Other (assualted) Daughter         assaulted    Cancer Maternal Grandmother         uterine    Substance Abuse Maternal Grandmother     Neurological Disorder Maternal Grandmother         Dementia    Alcohol and Other Disorders Associated Maternal Grandfather     Psychiatric Maternal Grandfather         suicide    Genito-Urinary Disorder Sister         hysterectomy, fibroid    Musculo-skelatal Disorder Sister         spinal surgery    Arthritis Sister         BERNY, TKA    Arthritis Daughter         rheumatoid arthritis    Asthma Daughter         PCOS    Asthma Daughter     Allergies Daughter     Glaucoma Daughter         suspect     Social History     Occupational History     Employer: [employer]   Tobacco Use    Smoking status: Never    Smokeless tobacco: Never    Tobacco comments:     2nd hand smoke growing up    Vaping Use    Vaping status: Never Used   Substance and Sexual Activity    Alcohol use: No     Comment: in AA sobriety since 1999    Drug use: No    Sexual activity: Yes      Partners: Male      Assessment       Review of Systems (negative unless bolded):  General: fevers, chills, fatigue  CV:  chest pain, palpitations, leg swelling  Msk: bodyaches, neck pain, neck stiffness  Skin: rashes, open wounds, nonhealing ulcers  Hem: bleeds easily, bruise easily, immunocompromised  Neuro: dizziness, light headedness, headaches  Psych: anxious, depressed, anger issues    La Gates PA-C  Hand, Wrist, & Elbow Surgery  Physician Assistant to Dr. Slade Menon  [facility]  [address] [place] [address], [address], [email or web address]  [email or web address]  t: [phone]  f: [phone]

## 2025-05-20 ENCOUNTER — PATIENT MESSAGE (OUTPATIENT)
Facility: CLINIC | Age: 60
End: 2025-05-20

## 2025-05-20 DIAGNOSIS — I10 ESSENTIAL HYPERTENSION: ICD-10-CM

## 2025-05-20 DIAGNOSIS — E66.3 OVERWEIGHT (BMI 25.0-29.9): ICD-10-CM

## 2025-05-20 DIAGNOSIS — R63.2 BINGE EATING: ICD-10-CM

## 2025-05-20 DIAGNOSIS — Z51.81 THERAPEUTIC DRUG MONITORING: Primary | ICD-10-CM

## 2025-05-26 ENCOUNTER — APPOINTMENT (OUTPATIENT)
Dept: CT IMAGING | Facility: HOSPITAL | Age: 60
End: 2025-05-26
Attending: EMERGENCY MEDICINE
Payer: COMMERCIAL

## 2025-05-26 ENCOUNTER — HOSPITAL ENCOUNTER (OUTPATIENT)
Age: 60
Discharge: ANOTHER HOSPITAL | End: 2025-05-26
Attending: EMERGENCY MEDICINE
Payer: COMMERCIAL

## 2025-05-26 ENCOUNTER — HOSPITAL ENCOUNTER (EMERGENCY)
Facility: HOSPITAL | Age: 60
Discharge: HOME | End: 2025-05-26
Attending: EMERGENCY MEDICINE
Payer: COMMERCIAL

## 2025-05-26 VITALS
SYSTOLIC BLOOD PRESSURE: 141 MMHG | DIASTOLIC BLOOD PRESSURE: 85 MMHG | WEIGHT: 160 LBS | HEART RATE: 73 BPM | BODY MASS INDEX: 28 KG/M2 | RESPIRATION RATE: 18 BRPM | TEMPERATURE: 98 F | OXYGEN SATURATION: 98 %

## 2025-05-26 VITALS
WEIGHT: 160 LBS | TEMPERATURE: 98 F | DIASTOLIC BLOOD PRESSURE: 73 MMHG | HEART RATE: 64 BPM | RESPIRATION RATE: 16 BRPM | BODY MASS INDEX: 28.35 KG/M2 | SYSTOLIC BLOOD PRESSURE: 128 MMHG | OXYGEN SATURATION: 100 % | HEIGHT: 63 IN

## 2025-05-26 DIAGNOSIS — R10.9 LEFT FLANK PAIN: Primary | ICD-10-CM

## 2025-05-26 LAB
ALBUMIN SERPL-MCNC: 4.9 G/DL (ref 3.2–4.8)
ALBUMIN/GLOB SERPL: 2 {RATIO} (ref 1–2)
ALP LIVER SERPL-CCNC: 75 U/L (ref 46–118)
ALT SERPL-CCNC: 18 U/L (ref 10–49)
ANION GAP SERPL CALC-SCNC: 11 MMOL/L (ref 0–18)
AST SERPL-CCNC: 27 U/L (ref ?–34)
BASOPHILS # BLD AUTO: 0.05 X10(3) UL (ref 0–0.2)
BASOPHILS NFR BLD AUTO: 0.8 %
BILIRUB SERPL-MCNC: 0.3 MG/DL (ref 0.3–1.2)
BILIRUB UR QL STRIP.AUTO: NEGATIVE
BUN BLD-MCNC: 15 MG/DL (ref 9–23)
CALCIUM BLD-MCNC: 9.4 MG/DL (ref 8.7–10.6)
CHLORIDE SERPL-SCNC: 105 MMOL/L (ref 98–112)
CLARITY UR REFRACT.AUTO: CLEAR
CO2 SERPL-SCNC: 27 MMOL/L (ref 21–32)
CREAT BLD-MCNC: 0.99 MG/DL (ref 0.55–1.02)
EGFRCR SERPLBLD CKD-EPI 2021: 66 ML/MIN/1.73M2 (ref 60–?)
EOSINOPHIL # BLD AUTO: 0.14 X10(3) UL (ref 0–0.7)
EOSINOPHIL NFR BLD AUTO: 2.2 %
ERYTHROCYTE [DISTWIDTH] IN BLOOD BY AUTOMATED COUNT: 12.1 %
GLOBULIN PLAS-MCNC: 2.5 G/DL (ref 2–3.5)
GLUCOSE BLD-MCNC: 92 MG/DL (ref 70–99)
GLUCOSE UR STRIP.AUTO-MCNC: NORMAL MG/DL
HCT VFR BLD AUTO: 38.7 % (ref 35–48)
HGB BLD-MCNC: 13.2 G/DL (ref 12–16)
IMM GRANULOCYTES # BLD AUTO: 0.03 X10(3) UL (ref 0–1)
IMM GRANULOCYTES NFR BLD: 0.5 %
KETONES UR STRIP.AUTO-MCNC: NEGATIVE MG/DL
LEUKOCYTE ESTERASE UR QL STRIP.AUTO: NEGATIVE
LIPASE SERPL-CCNC: 31 U/L (ref 12–53)
LYMPHOCYTES # BLD AUTO: 1.79 X10(3) UL (ref 1–4)
LYMPHOCYTES NFR BLD AUTO: 28 %
MCH RBC QN AUTO: 31.4 PG (ref 26–34)
MCHC RBC AUTO-ENTMCNC: 34.1 G/DL (ref 31–37)
MCV RBC AUTO: 91.9 FL (ref 80–100)
MONOCYTES # BLD AUTO: 0.45 X10(3) UL (ref 0.1–1)
MONOCYTES NFR BLD AUTO: 7 %
NEUTROPHILS # BLD AUTO: 3.93 X10 (3) UL (ref 1.5–7.7)
NEUTROPHILS # BLD AUTO: 3.93 X10(3) UL (ref 1.5–7.7)
NEUTROPHILS NFR BLD AUTO: 61.5 %
NITRITE UR QL STRIP.AUTO: NEGATIVE
OSMOLALITY SERPL CALC.SUM OF ELEC: 296 MOSM/KG (ref 275–295)
PH UR STRIP.AUTO: 6 [PH] (ref 5–8)
PLATELET # BLD AUTO: 281 10(3)UL (ref 150–450)
POTASSIUM SERPL-SCNC: 3.7 MMOL/L (ref 3.5–5.1)
PROT SERPL-MCNC: 7.4 G/DL (ref 5.7–8.2)
PROT UR STRIP.AUTO-MCNC: NEGATIVE MG/DL
RBC # BLD AUTO: 4.21 X10(6)UL (ref 3.8–5.3)
RBC UR QL AUTO: NEGATIVE
SODIUM SERPL-SCNC: 143 MMOL/L (ref 136–145)
SP GR UR STRIP.AUTO: 1.01 (ref 1–1.03)
UROBILINOGEN UR STRIP.AUTO-MCNC: NORMAL MG/DL
WBC # BLD AUTO: 6.4 X10(3) UL (ref 4–11)

## 2025-05-26 PROCEDURE — 99213 OFFICE O/P EST LOW 20 MIN: CPT

## 2025-05-26 PROCEDURE — 99284 EMERGENCY DEPT VISIT MOD MDM: CPT

## 2025-05-26 PROCEDURE — 36415 COLL VENOUS BLD VENIPUNCTURE: CPT

## 2025-05-26 PROCEDURE — 74177 CT ABD & PELVIS W/CONTRAST: CPT | Performed by: EMERGENCY MEDICINE

## 2025-05-26 PROCEDURE — 83690 ASSAY OF LIPASE: CPT | Performed by: EMERGENCY MEDICINE

## 2025-05-26 PROCEDURE — 81003 URINALYSIS AUTO W/O SCOPE: CPT | Performed by: EMERGENCY MEDICINE

## 2025-05-26 PROCEDURE — 85025 COMPLETE CBC W/AUTO DIFF WBC: CPT | Performed by: EMERGENCY MEDICINE

## 2025-05-26 PROCEDURE — 80053 COMPREHEN METABOLIC PANEL: CPT | Performed by: EMERGENCY MEDICINE

## 2025-05-26 NOTE — ED PROVIDER NOTES
Patient Seen in: [facility]        History  Chief Complaint   Patient presents with    Abdominal Pain     Stated Complaint: Abd Pain    Subjective:   HPI            Patient complaining of left flank pain.  Pain has been present at about a 3 or 4 out of 10 for about 4 days now.  She notes discomfort in the left lower flank.  Patient has some chronic issues of difficulty draining her bladder since a childbirth.  She denies any blood or burning with urination.  No rash in the area.  No injury.  No anterior left abdominal pain.  Patient with no personal or family history of kidney stones      Objective:     No pertinent past medical history.            No pertinent past surgical history.              No pertinent social history.            Review of Systems    Positive for stated complaint: Abd Pain  Other systems are as noted in HPI.  Constitutional and vital signs reviewed.      All other systems reviewed and negative except as noted above.                  Physical Exam    ED Triage Vitals [05/26/25 1408]   /85   Pulse 73   Resp 18   Temp 98.3 °F (36.8 °C)   Temp src Oral   SpO2 98 %   O2 Device None (Room air)       Current Vitals:   Vital Signs  BP: 141/85  Pulse: 73  Resp: 18  Temp: 98.3 °F (36.8 °C)  Temp src: Oral    Oxygen Therapy  SpO2: 98 %  O2 Device: None (Room air)            Physical Exam  General: The patient is awake, alert, conversant.   Eyes: sclera white.  Lids and lashes are normal.  Abdomen: Soft, nondistended.  Completely nontender it with fairly deep palpation.  No pulsatile mass  No CVA tenderness noted   Skin:  no rash in the area patient discomfort  Neurologic:  Mental status as above.  Patient moves all extremities with good strength and coordination.            ED Course  Labs Reviewed - No data to display                         MDM    Patient complaining of left flank pain without dysuria or hematuria.  Symptoms are not typical pyelonephritis but this include the  differential.  Renal colic also consideration.  Musculoskeletal flank pain a consideration but it does not seem particularly related to movement.  Appropriate workup would include ultrasound or CT imaging.  These modalities are not available at the urgent care today.   I recommended [facility] emergency department        Medical Decision Making      Disposition and Plan     Clinical Impression:  1. Left flank pain         Disposition:  Ic to ed  5/26/2025  2:17 pm    Follow-up:  No follow-up provider specified.        Medications Prescribed:  Current Discharge Medication List                Supplementary Documentation:

## 2025-05-26 NOTE — DISCHARGE INSTRUCTIONS
Follow-up with your primary care doctor within the next week for reassessment.  Return for any worsening pain, fevers, difficulty breathing or any other concerning symptoms.

## 2025-05-26 NOTE — ED INITIAL ASSESSMENT (HPI)
Pt to ER ambulatory, 4 days left flank pain tenderness 3/10. Denies fever or chills. \"When my bladder is really full, it hurts more, but that's it\" Denies any other complaints.

## 2025-05-26 NOTE — ED PROVIDER NOTES
Patient Seen in: [facility] Emergency Department        History  Chief Complaint   Patient presents with    Abdomen/Flank Pain     Stated Complaint: flank pain    Subjective:   HPI            59-year-old female presents today for evaluation of left flank pain.  4 days ago, patient began having a constant left flank pain.  The pain sometimes waxes and wanes in intensity without any clear alleviating or aggravating factors.  Patient denies any fevers, vomiting, dysuria, hematuria, diarrhea or new constipation.  The pain is nonradiating.  Patient denies any shortness of breath.      Objective:     Past Medical History:    Alcohol abuse, in remission    Asthma (HCC)    Cancer (HCC)    thyroid cancer     Chronic rhinitis    Endometrial hyperplasia    Endometriosis    hysterectomy    Esophageal reflux    Glaucoma suspect    Hashimoto's thyroiditis    Latent tuberculosis    Menorrhagia    Multinodular goiter (nontoxic)    Ocular migraine    Syncope              Past Surgical History:   Procedure Laterality Date    Biopsy of thyroid,percut  6/19/2012    Biopsy of thyroid,percut  4/2013    Colonoscopy  3/4/13 - KEITH Gaines    hemorrhoids, repeat 2023 w/MAC.    D & c  2008    Endometr ablate, thermal  2009    Hysterectomy  7/2010    KAI, single oophorectomy (PCOS, hemorrhagic cyst)     Impact tooth rem bony w/comp Bilateral 1983    wisdom teeth x 3    Nm thyroid i-131 therapy for hyperthyroidism int  (rqt=48879)  2/5/2015    51 mc    Osteochondral talus autogrft  11/30/2011    right ankle    Other surgical history Right     ankle surgery     Thyroidectomy,malig,ltd neck surg      Total abdom hysterectomy      Upper gi endoscopy - referral  3/4/13 - KEITH Gaines    normal EGD.                 Social History     Socioeconomic History    Marital status:      Spouse name: Edgar    Number of children: 3    Years of education: 16   Occupational History     Employer: [employer]   Tobacco Use    Smoking status: Never     Smokeless tobacco: Never    Tobacco comments:     2nd hand smoke growing up    Vaping Use    Vaping status: Never Used   Substance and Sexual Activity    Alcohol use: No     Comment: in AA sobriety since 1999    Drug use: No    Sexual activity: Yes     Partners: Male   Other Topics Concern     Service No    Blood Transfusions No    Caffeine Concern Yes     Comment: 1-2 cups coffee/day    Occupational Exposure No    Hobby Hazards No    Sleep Concern No    Stress Concern No    Weight Concern Yes    Special Diet No    Back Care No    Exercise Yes    Bike Helmet No    Seat Belt Yes    Self-Exams Yes   Social History Narrative    Lives with  (2nd) and daughter.    Enjoys music.                                Physical Exam    ED Triage Vitals [05/26/25 1506]   /74   Pulse 73   Resp 18   Temp 98 °F (36.7 °C)   Temp src Temporal   SpO2 98 %   O2 Device None (Room air)       Current Vitals:   Vital Signs  BP: 128/73  Pulse: 64  Resp: 16  Temp: 98 °F (36.7 °C)  Temp src: Temporal  MAP (mmHg): 87    Oxygen Therapy  SpO2: 100 %  O2 Device: None (Room air)            Physical Exam  Vitals and nursing note reviewed.   Constitutional:       Appearance: Normal appearance.   HENT:      Head: Normocephalic.      Nose: Nose normal.      Mouth/Throat:      Mouth: Mucous membranes are moist.   Eyes:      Extraocular Movements: Extraocular movements intact.   Cardiovascular:      Rate and Rhythm: Normal rate.   Pulmonary:      Effort: Pulmonary effort is normal.   Abdominal:      General: Abdomen is flat.      Tenderness: There is no abdominal tenderness. There is no right CVA tenderness, left CVA tenderness, guarding or rebound.   Musculoskeletal:         General: Normal range of motion.   Skin:     General: Skin is warm.   Neurological:      General: No focal deficit present.      Mental Status: She is alert.   Psychiatric:         Mood and Affect: Mood normal.             ED Course  Labs Reviewed   COMP METABOLIC  PANEL (14) - Abnormal; Notable for the following components:       Result Value    Calculated Osmolality 296 (*)     Albumin 4.9 (*)     All other components within normal limits   LIPASE - Normal   CBC WITH DIFFERENTIAL WITH PLATELET   URINALYSIS WITH CULTURE REFLEX   RAINBOW DRAW LAVENDER   RAINBOW DRAW LIGHT GREEN          CT ABDOMEN+PELVIS(CONTRAST ONLY)(CPT=74177)  Result Date: 5/26/2025  PROCEDURE:  CT ABDOMEN+PELVIS (CONTRAST ONLY) (CPT=74177)  COMPARISON:  None.  INDICATIONS:  flank pain  TECHNIQUE:  CT scanning was performed from the dome of the diaphragm to the pubic symphysis with non-ionic intravenous contrast material. Post contrast coronal MPR imaging was performed.  Dose reduction techniques were used. Dose information is transmitted to the ACR (American College of Radiology) NRDR (National Radiology Data Registry) which includes the Dose Index Registry.  PATIENT STATED HISTORY:(As transcribed by Technologist)  Patient with left flank tenderness x4 days. When bladder is full, it hurts more. Denies fevers/vomiting. History of thyroid Cancer.   CONTRAST USED:  80cc of Isovue 370  FINDINGS:  LIVER:  No enlargement, atrophy, abnormal density, or significant focal lesion.  BILIARY:  No visible dilatation or calcification.  PANCREAS:  No lesion, fluid collection, ductal dilatation, or atrophy.  SPLEEN:  No enlargement or focal lesion.  KIDNEYS:  No mass, obstruction, or calcification.  ADRENALS:  No mass or enlargement.  AORTA/VASCULAR:  No aneurysm or dissection.  RETROPERITONEUM:  No mass or adenopathy.  BOWEL/MESENTERY:  No visible mass, obstruction, or bowel wall thickening.  ABDOMINAL WALL:  No mass or hernia.  URINARY BLADDER:  No visible focal wall thickening, lesion, or calculus.  PELVIC NODES:  No adenopathy.  PELVIC ORGANS:  Hysterectomy. BONES:  No bony lesion or fracture.  LUNG BASES:  No visible pulmonary or pleural disease.  OTHER:  Negative.             CONCLUSION:  No acute process  identified within the abdomen or pelvis.   LOCATION:  [facility]   Dictated by (CST): Isela Escoto MD on 5/26/2025 at 6:26 PM     Finalized by (CST): Isela Escoto MD on 5/26/2025 at 6:29 PM                         [facility]     Differential Diagnosis  59-year-old female presents today for evaluation of several days of left flank pain.  She has no CVA tenderness on exam.  Her belly is benign.  She is satting well on room air and her work of breathing is nonlabored.  Differential include ureterolithiasis, pyelonephritis, diverticulitis, splenic infarct, muscular strain.  Plan for CT along with labs, will reassess.    6:40 pm  ED workup is reassuring.  On reassessment, patient remains well-appearing.  I reviewed results with patient, and explained we do not have a clear source of her presenting symptoms today.  I discussed the possibility of a muscular strain along with supportive care for home.  I advised PCP follow-up for reassessment.  Patient is comfortable with plan        Medical Decision Making      Disposition and Plan     Clinical Impression:  1. Left flank pain         Disposition:  Discharge  5/26/2025  6:41 pm    Follow-up:  Matt Vora MD  [address] [facility]  [address] [place] [id number]  [phone]    Call in 1 day(s)            Medications Prescribed:  Discharge Medication List as of 5/26/2025  6:46 PM                Supplementary Documentation:

## 2025-05-29 NOTE — TELEPHONE ENCOUNTER
We could try adding a medication called metformin - used for diabetes, prediabetes, insulin resistance, PCOS, it works by delaying gastric emptying so you feel full for longer, it also helps with leptin resistance    Pt is also due for an appointmen

## 2025-06-02 RX ORDER — METFORMIN HYDROCHLORIDE 750 MG/1
750 TABLET, EXTENDED RELEASE ORAL DAILY
Qty: 90 TABLET | Refills: 0 | Status: SHIPPED | OUTPATIENT
Start: 2025-06-02

## 2025-06-02 NOTE — TELEPHONE ENCOUNTER
Would she like to try metformin?  I could send that and then she could keep her shivam and get on wait list as well

## 2025-06-06 ENCOUNTER — TELEMEDICINE (OUTPATIENT)
Facility: CLINIC | Age: 60
End: 2025-06-06
Payer: COMMERCIAL

## 2025-06-06 DIAGNOSIS — R63.2 BINGE EATING: ICD-10-CM

## 2025-06-06 DIAGNOSIS — I10 ESSENTIAL HYPERTENSION: ICD-10-CM

## 2025-06-06 DIAGNOSIS — F43.9 STRESS: ICD-10-CM

## 2025-06-06 DIAGNOSIS — Z51.81 THERAPEUTIC DRUG MONITORING: Primary | ICD-10-CM

## 2025-06-06 DIAGNOSIS — E66.3 OVERWEIGHT (BMI 25.0-29.9): ICD-10-CM

## 2025-06-06 RX ORDER — BUPROPION HYDROCHLORIDE 300 MG/1
300 TABLET ORAL DAILY
Qty: 90 TABLET | Refills: 1 | Status: SHIPPED | OUTPATIENT
Start: 2025-06-06

## 2025-06-06 RX ORDER — METFORMIN HYDROCHLORIDE 750 MG/1
750 TABLET, EXTENDED RELEASE ORAL DAILY
Qty: 90 TABLET | Refills: 0 | Status: SHIPPED | OUTPATIENT
Start: 2025-06-06

## 2025-06-06 RX ORDER — NALTREXONE HYDROCHLORIDE 50 MG/1
25 TABLET, FILM COATED ORAL 2 TIMES DAILY
Qty: 90 TABLET | Refills: 1 | Status: SHIPPED | OUTPATIENT
Start: 2025-06-06

## 2025-06-06 NOTE — PROGRESS NOTES
This visit is conducted using Telemedicine with live, interactive video and audio.    Patient has been referred to the [facility] website at [email or web address] to review the yearly Consent to Treat document.    Patient understands and accepts financial responsibility for any deductible, co-insurance and/or co-pays associated with this service.      HISTORY OF PRESENT ILLNESS  Chief Complaint   Patient presents with    Weight Check       Camelia Waldrop is a 59 year old female here for follow up in medical weight loss program.   158lbs  Pt is compliant on wellbutrin and naltrexone  Denies chest pain, shortness of breath, dizziness, blurred vision, headache, paresthesia, nausea/vomiting.   Daughter graduated high school, valedictorian  A lot of graduation parties  Changes at work  Stress has been a lot higher  Increase in stress eating    Exercise/Activity: the past week has been trying to get back into routine with some light strength training, walking a lot more, sometimes getting on stationary bike  Nutrition: 24 hour food log reviewed, eating regular meals, +protein  Stress: 8/10, managing the best she can  Sleep: 6 hours/night     [facility] Follow Up    General Information  Success Moment: While I’m not yet back at my starting weight when I first   came for help I’ve gained almost 30 lbs back  Challenging Moment: I have little control of eating unhealthy foods or too   much between or at meals  Nutrition Recall  Breakfast: Ex: plain dairy-free yogurt with fresh fruit, nuts and honey   with coffee Lunch: Beans and rice, or Mexican leftovers or [employer] frozen   food (g/f)   Dinner: Varies! Ex: chicken fajitas with beans and corn tortillas (2   tacos) Snacks: Nuts (1/2 cup), g/f rice crackers, fruit, protein bars   Fluids: Lemonade (16 oz), Diet Coke (one can), Coffee with almond milk and   sugar-free sweeteners (12-16 oz daily), water (3-4 8 oz glasses daily)   Dining Out: 2   Exercise   Patient stated exercises #  days/week: 4  Patient stated perceived level of   exertion: 3 Anaerobic Days: 2   Aerobic Days: 2   Patient stated average level of stress: 8  Sleep   Patient stated # hours uninterrupted sleep: 6   Patient stated feels   restful: No      Cause of disruption of sleep: Noisy spouse/ staying up to watch tv   Goals: Review medications and develop a new plan              Wt Readings from Last 6 Encounters:   05/26/25 160 lb (72.6 kg)   05/26/25 160 lb (72.6 kg)   01/29/25 160 lb (72.6 kg)   08/21/24 160 lb (72.6 kg)   06/19/23 146 lb (66.2 kg)   06/09/23 146 lb (66.2 kg)            Breakfast Lunch Dinner Snacks Fluids   Reviewed           REVIEW OF SYSTEMS  GENERAL HEALTH: feels well otherwise, denied any fevers chills or night sweats   RESPIRATORY: denies shortness of breath   CARDIOVASCULAR: denies chest pain  GI: denies abdominal pain    EXAM  LMP  (LMP Unknown)   GENERAL: well developed, well nourished,in no apparent distress, A/O x3  SKIN: no rashes,no suspicious lesions on visible skin  HEENT: atraumatic, normocephalic  LUNGS: no increased effort or work with breathing   NEURO: speaking fluently and in clear sentences    Lab Results   Component Value Date    WBC 6.4 05/26/2025    RBC 4.21 05/26/2025    HGB 13.2 05/26/2025    HCT 38.7 05/26/2025    MCV 91.9 05/26/2025    MCH 31.4 05/26/2025    MCHC 34.1 05/26/2025    RDW 12.1 05/26/2025    .0 05/26/2025     Lab Results   Component Value Date    GLU 92 05/26/2025    BUN 15 05/26/2025    BUNCREA 14.6 05/21/2021    CREATSERUM 0.99 05/26/2025    ANIONGAP 11 05/26/2025    GFR 82 03/05/2016    GFRNAA 73 05/21/2021    GFRAA 84 05/21/2021    CA 9.4 05/26/2025    OSMOCALC 296 (H) 05/26/2025    ALKPHO 75 05/26/2025    AST 27 05/26/2025    ALT 18 05/26/2025    BILT 0.3 05/26/2025    TP 7.4 05/26/2025    ALB 4.9 (H) 05/26/2025    GLOBULIN 2.5 05/26/2025    AGRATIO 2.0 03/19/2015     05/26/2025    K 3.7 05/26/2025     05/26/2025    CO2 27.0 05/26/2025     No  results found for: \"EAG\", \"A1C\"  Lab Results   Component Value Date    CHOLEST 188.00 12/23/2020    TRIG 89.00 12/23/2020    HDL 65 12/23/2020     12/23/2020    VLDL 18 12/23/2020     Lab Results   Component Value Date    T4F 1.56 12/23/2020    TSH 0.519 07/16/2021     Lab Results   Component Value Date    B12 689 12/23/2020    VITB12 559 03/19/2015     Lab Results   Component Value Date    VITD 31.69 12/23/2020       Medications Ordered Prior to Encounter[1]    ASSESSMENT  Analyzed weight data:       Diagnoses and all orders for this visit:    Therapeutic drug monitoring  -     buPROPion  MG Oral Tablet 24 Hr; Take 1 tablet (300 mg total) by mouth daily.  -     naltrexone 50 MG Oral Tab; Take 0.5 tablets (25 mg total) by mouth in the morning and 0.5 tablets (25 mg total) before bedtime.    Overweight (BMI 25.0-29.9)  -     buPROPion  MG Oral Tablet 24 Hr; Take 1 tablet (300 mg total) by mouth daily.  -     naltrexone 50 MG Oral Tab; Take 0.5 tablets (25 mg total) by mouth in the morning and 0.5 tablets (25 mg total) before bedtime.    Binge eating  -     buPROPion  MG Oral Tablet 24 Hr; Take 1 tablet (300 mg total) by mouth daily.  -     naltrexone 50 MG Oral Tab; Take 0.5 tablets (25 mg total) by mouth in the morning and 0.5 tablets (25 mg total) before bedtime.    Essential hypertension    Stress    Other orders  -     metFORMIN  MG Oral Tablet 24 Hr; Take 1 tablet (750 mg total) by mouth daily.        PLAN  Initial Weight: 179lbs  Initial Weight Date: 3/25/21  Today's Weight: 158lbs  5% Goal: 8.95lbs  10% Goal: 17.9lbs  Total Weight Loss: -0lbs/Net loss 21lbs    Will continue wellbutrin and naltrexone - advised side effects and adverse effects of medication   Will begin metformin - discussed MOA, advised side effects and adverse effects of medication  Continue to work on mindful eating  HTN - stable with lifestyle changes  Stress has been higher, work on stress management,  meditation, yoga, coloring, reading, puzzles, etc  Consistency with logging foods - protein and produce  Incorporate strength/resistance training  Nutrition: low carb diet/ recommended to eat breakfast daily/ regular protein intake  Medication use and side effects reviewed with patient.  Medication contraindications: h/o thyroid cancer, topamax  Follow up with dietitian and psychologist as recommended.  Discussed the role of sleep and stress in weight management.  Counseled on comprehensive weight loss plan including attention to nutrition, exercise and behavior/stress management for success. See patient instruction below for more details.  Discussed strategies to overcome barriers to successful weight loss and weight maintenance  FITTE: ACSM recommendations (150-300 minutes/ week in active weight loss)   Weight Loss consent to treat reviewed and signed     There are no Patient Instructions on file for this visit.    No follow-ups on file.    Patient verbalizes understanding.    Chelsie Gore PA-C  6/6/2025    Please note that the following visit was completed using two-way, real-time interactive audio and video communication.  This has been done in good nelsy to provide continuity of care in the best interest of the provider-patient relationship, due to the ongoing public health crisis/national emergency and because of restrictions of visitation.  There are limitations of this visit as no physical exam could be performed.  Every conscious effort was taken to allow for sufficient and adequate time.  This billing was spent on reviewing labs, medications, radiology tests and decision making.  Appropriate medical decision-making and tests are ordered as detailed in the plan of care above    Chelsie Gore PA-C           [1]   Current Outpatient Medications on File Prior to Visit   Medication Sig Dispense Refill    metFORMIN  MG Oral Tablet 24 Hr Take 1 tablet (750 mg total) by mouth daily. 90 tablet 0     Meloxicam 7.5 MG Oral Tab Take 1 tablet (7.5 mg total) by mouth every 12 (twelve) hours as needed for Pain. (Patient not taking: Reported on 5/26/2025)      topiramate 50 MG Oral Tab Take 1 tablet (50 mg total) by mouth 2 (two) times daily. 180 tablet 1    topiramate 25 MG Oral Tab Take 1 tablet (25 mg total) by mouth 2 (two) times daily. 20 tablet 0    NURTEC 75 MG Oral Tablet Dispersible Place 1 tablet onto the tongue as needed.      diclofenac 50 MG Oral Tab EC Take 1 tablet (50 mg total) by mouth 2 (two) times daily as needed. (Patient not taking: Reported on 1/29/2025) 180 tablet 0    montelukast 10 MG Oral Tab Take 1 tablet (10 mg total) by mouth nightly. 90 tablet 0    Azelastine HCl 0.1 % Nasal Solution 2 sprays by Nasal route 2 (two) times daily. 90 mL 3    VALACYCLOVIR 500 MG Oral Tab TAKE 1 TABLET(500 MG) BY MOUTH TWICE DAILY 180 tablet 0    Galcanezumab-gnlm (EMGALITY) 120 MG/ML Subcutaneous Solution Auto-injector Inject 1 pen into the skin every 30 (thirty) days. (Patient not taking: Reported on 5/26/2025) 3 each 0    SYNTHROID 112 MCG Oral Tab Synthroid brand 112 mcg - 6 days per week but just HALF tablet on Sundays. 90 tablet 3    pregabalin 25 MG Oral Cap Take 2 capsules (50 mg total) by mouth 2 (two) times a day. (Patient not taking: Reported on 5/26/2025) 120 capsule 3    Respiratory Therapy Supplies (REPLACEMENT DISP NEBULIZER) Does not apply Kit Please dispense nebulizer kit plus tubing (Patient not taking: Reported on 5/26/2025) 1 kit 0    Acidophilus/Pectin Oral Cap Take 1 capsule by mouth daily. (Patient not taking: Reported on 8/21/2024)      Albuterol Sulfate  (90 Base) MCG/ACT Inhalation Aero Soln Inhale 2 puffs into the lungs every 4 (four) hours as needed. 1 Inhaler 1    EPINEPHrine HCl 1 MG/ML Injection Solution Inject 0.3 mL (0.3 mg total) into the skin. (Patient not taking: Reported on 5/26/2025)      Ipratropium Bromide 0.06 % Nasal Solution INSTILL 1-2 SPRAYS INTRANASALLY 3  TIMES A DAY AS NEEDED 1 Bottle 1    Saline Nasal Spray 0.65 % Nasal Solution 1 spray by Nasal route in the morning and 1 spray before bedtime. 30 mL 12    Magnesium Cl-Calcium Carbonate (SLOW-MAG) 71.5-119 MG Oral Tab EC Three tabs po daily 270 tablet 2    Multiple Vitamin (MULTI-VITAMIN) Oral Tab Take 1 tablet by mouth in the morning.      Omeprazole Magnesium (PRILOSEC OTC) 20 MG Oral Tab EC Take 1 tablet (20 mg total) by mouth daily as needed. 14 tablet 3    Peak Flow Meter (POCKET PEAK FLOW METER) Does not apply Device Use as needed (Patient not taking: Reported on 5/26/2025) 1 Device 0     No current facility-administered medications on file prior to visit.

## 2025-08-27 RX ORDER — METFORMIN HYDROCHLORIDE 750 MG/1
750 TABLET, EXTENDED RELEASE ORAL DAILY
Qty: 90 TABLET | Refills: 0 | Status: SHIPPED | OUTPATIENT
Start: 2025-08-27

## (undated) DEVICE — DRESSING PETRO W3XL3IN OIL EMUL N ADH GZ KNIT

## (undated) DEVICE — DISPOSABLE BIPOLAR FORCEPS 4" (10.2CM) JEWELERS, STRAIGHT 0.4MM TIP AND 12 FT. (3.6M) CABLE: Brand: KIRWAN

## (undated) DEVICE — STERILE POLYISOPRENE POWDER-FREE SURGICAL GLOVES: Brand: PROTEXIS

## (undated) DEVICE — SOLUTION RUBBING 4OZ 70% ISO ALC CLR

## (undated) DEVICE — STERILE POLYISOPRENE POWDER-FREE SURGICAL GLOVES WITH EMOLLIENT COATING: Brand: PROTEXIS

## (undated) DEVICE — SPONGE GZ 4XL4IN 100% COT 12 PLY TYP VII WVN

## (undated) DEVICE — SOLUTION IRRIG 1000ML 0.9% NACL USP BTL

## (undated) DEVICE — STANDARD HYPODERMIC NEEDLE,POLYPROPYLENE HUB: Brand: MONOJECT

## (undated) DEVICE — MINI-BLADE®: Brand: BEAVER®

## (undated) DEVICE — UPPER EXTREMITY CDS-LF: Brand: MEDLINE INDUSTRIES, INC.

## (undated) NOTE — LETTER
Date & Time: 8/7/2021, 10:37 AM  Patient: Crsitin Waldrop  Encounter Provider(s):    SAVI Alfred       To Whom It May Concern:    Carlos Johnson was seen and treated in our department on 8/7/2021.  She should not return to work until August 10, 2

## (undated) NOTE — LETTER
21    Re: Satinder Morales  : [birth year]        To Whom It May Concern,    Mulugeta Akbar is my patient currently enrolled in our medically supervised weight loss clinic for treatment of Obesity.      As a patient in our clinic, she is encouraged to DR NEHA ROCA